# Patient Record
Sex: MALE | Race: WHITE | HISPANIC OR LATINO | Employment: FULL TIME | URBAN - METROPOLITAN AREA
[De-identification: names, ages, dates, MRNs, and addresses within clinical notes are randomized per-mention and may not be internally consistent; named-entity substitution may affect disease eponyms.]

---

## 2018-06-04 ENCOUNTER — HOSPITAL ENCOUNTER (EMERGENCY)
Facility: CLINIC | Age: 23
Discharge: HOME | End: 2018-06-04
Attending: FAMILY MEDICINE | Admitting: FAMILY MEDICINE
Payer: MEDICAID

## 2018-06-04 VITALS
OXYGEN SATURATION: 97 % | SYSTOLIC BLOOD PRESSURE: 121 MMHG | DIASTOLIC BLOOD PRESSURE: 89 MMHG | RESPIRATION RATE: 13 BRPM | HEART RATE: 82 BPM | TEMPERATURE: 98.2 F

## 2018-06-04 DIAGNOSIS — F41.9 ANXIETY: ICD-10-CM

## 2018-06-04 DIAGNOSIS — R20.2 PARESTHESIAS: ICD-10-CM

## 2018-06-04 DIAGNOSIS — R07.9 CHEST PAIN, UNSPECIFIED TYPE: ICD-10-CM

## 2018-06-04 LAB
ALBUMIN SERPL-MCNC: 4.1 G/DL (ref 3.4–5)
ALP SERPL-CCNC: 73 U/L (ref 40–150)
ALT SERPL W P-5'-P-CCNC: 75 U/L (ref 0–70)
ANION GAP SERPL CALCULATED.3IONS-SCNC: 8 MMOL/L (ref 3–14)
AST SERPL W P-5'-P-CCNC: 31 U/L (ref 0–45)
BASOPHILS # BLD AUTO: 0.1 10E9/L (ref 0–0.2)
BASOPHILS NFR BLD AUTO: 0.8 %
BILIRUB SERPL-MCNC: 0.5 MG/DL (ref 0.2–1.3)
BUN SERPL-MCNC: 12 MG/DL (ref 7–30)
CALCIUM SERPL-MCNC: 9.3 MG/DL (ref 8.5–10.1)
CHLORIDE SERPL-SCNC: 105 MMOL/L (ref 94–109)
CO2 SERPL-SCNC: 26 MMOL/L (ref 20–32)
CREAT SERPL-MCNC: 0.76 MG/DL (ref 0.66–1.25)
DIFFERENTIAL METHOD BLD: NORMAL
EOSINOPHIL # BLD AUTO: 0.1 10E9/L (ref 0–0.7)
EOSINOPHIL NFR BLD AUTO: 0.9 %
ERYTHROCYTE [DISTWIDTH] IN BLOOD BY AUTOMATED COUNT: 11.5 % (ref 10–15)
GFR SERPL CREATININE-BSD FRML MDRD: >90 ML/MIN/1.7M2
GLUCOSE SERPL-MCNC: 89 MG/DL (ref 70–99)
HCT VFR BLD AUTO: 43.6 % (ref 40–53)
HGB BLD-MCNC: 14.8 G/DL (ref 13.3–17.7)
IMM GRANULOCYTES # BLD: 0.1 10E9/L (ref 0–0.4)
IMM GRANULOCYTES NFR BLD: 0.8 %
LYMPHOCYTES # BLD AUTO: 2.6 10E9/L (ref 0.8–5.3)
LYMPHOCYTES NFR BLD AUTO: 27.9 %
MCH RBC QN AUTO: 29.7 PG (ref 26.5–33)
MCHC RBC AUTO-ENTMCNC: 33.9 G/DL (ref 31.5–36.5)
MCV RBC AUTO: 88 FL (ref 78–100)
MONOCYTES # BLD AUTO: 0.6 10E9/L (ref 0–1.3)
MONOCYTES NFR BLD AUTO: 6.5 %
NEUTROPHILS # BLD AUTO: 5.8 10E9/L (ref 1.6–8.3)
NEUTROPHILS NFR BLD AUTO: 63.1 %
NRBC # BLD AUTO: 0 10*3/UL
NRBC BLD AUTO-RTO: 0 /100
PLATELET # BLD AUTO: 260 10E9/L (ref 150–450)
POTASSIUM SERPL-SCNC: 4.3 MMOL/L (ref 3.4–5.3)
PROT SERPL-MCNC: 8.1 G/DL (ref 6.8–8.8)
RBC # BLD AUTO: 4.98 10E12/L (ref 4.4–5.9)
SODIUM SERPL-SCNC: 139 MMOL/L (ref 133–144)
TROPONIN I SERPL-MCNC: <0.015 UG/L (ref 0–0.04)
WBC # BLD AUTO: 9.2 10E9/L (ref 4–11)

## 2018-06-04 PROCEDURE — 84484 ASSAY OF TROPONIN QUANT: CPT | Performed by: FAMILY MEDICINE

## 2018-06-04 PROCEDURE — 85025 COMPLETE CBC W/AUTO DIFF WBC: CPT | Performed by: FAMILY MEDICINE

## 2018-06-04 PROCEDURE — 96374 THER/PROPH/DIAG INJ IV PUSH: CPT | Performed by: FAMILY MEDICINE

## 2018-06-04 PROCEDURE — 93005 ELECTROCARDIOGRAM TRACING: CPT | Performed by: FAMILY MEDICINE

## 2018-06-04 PROCEDURE — 93010 ELECTROCARDIOGRAM REPORT: CPT | Mod: Z6 | Performed by: FAMILY MEDICINE

## 2018-06-04 PROCEDURE — 25000128 H RX IP 250 OP 636: Performed by: FAMILY MEDICINE

## 2018-06-04 PROCEDURE — 99284 EMERGENCY DEPT VISIT MOD MDM: CPT | Mod: 25 | Performed by: FAMILY MEDICINE

## 2018-06-04 PROCEDURE — 96361 HYDRATE IV INFUSION ADD-ON: CPT | Performed by: FAMILY MEDICINE

## 2018-06-04 PROCEDURE — 80053 COMPREHEN METABOLIC PANEL: CPT | Performed by: FAMILY MEDICINE

## 2018-06-04 RX ORDER — KETOROLAC TROMETHAMINE 30 MG/ML
30 INJECTION, SOLUTION INTRAMUSCULAR; INTRAVENOUS ONCE
Status: COMPLETED | OUTPATIENT
Start: 2018-06-04 | End: 2018-06-04

## 2018-06-04 RX ORDER — SODIUM CHLORIDE, SODIUM LACTATE, POTASSIUM CHLORIDE, CALCIUM CHLORIDE 600; 310; 30; 20 MG/100ML; MG/100ML; MG/100ML; MG/100ML
1000 INJECTION, SOLUTION INTRAVENOUS CONTINUOUS
Status: DISCONTINUED | OUTPATIENT
Start: 2018-06-04 | End: 2018-06-04 | Stop reason: HOSPADM

## 2018-06-04 RX ADMIN — KETOROLAC TROMETHAMINE 30 MG: 30 INJECTION, SOLUTION INTRAMUSCULAR at 11:27

## 2018-06-04 RX ADMIN — SODIUM CHLORIDE, POTASSIUM CHLORIDE, SODIUM LACTATE AND CALCIUM CHLORIDE 1000 ML: 600; 310; 30; 20 INJECTION, SOLUTION INTRAVENOUS at 11:27

## 2018-06-04 NOTE — ED PROVIDER NOTES
History     Chief Complaint   Patient presents with     Chest Pain     left arm numbness. chest pain is reproducable with palpation. 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      ANTONIO August is a 22 year old male, past medical history is significant for hearing loss, presents to the emergency department with concerns of left-sided chest pain and left arm numbness.  History is obtained from the patient and to a lesser extent from his significant other.  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  Over the last couple of days left anterior chest pain which is sharp and worse with movement of the shoulder.  He notices pain going down his left arm and into his left neck and face.  Over the last 24 hours he has had 2 episodes where he has had left facial loss of sensation/numbness as well as loss of sensation numbness in his left arm.  Episode yesterday evening was first that lasted about 5 minutes and he had one again this morning at around 1030 that prompted him to come into the emergency department.  He was at work and was sent in from work.  His significant other notes no change in speech or difficulty expressing himself or understanding her.  No difficulties walking or moving arms or legs.  The patient is left-hand dominant.    Problem List:    Patient Active Problem List    Diagnosis Date Noted     HL (hearing loss) 06/29/2011     Priority: Medium        Past Medical History:    No past medical history on file.    Past Surgical History:    No past surgical history on file.    Family History:    No family history on file.    Social History:  Marital Status:  Single [1]  Social History   Substance Use Topics     Smoking status: Never Smoker     Smokeless tobacco: Never Used     Alcohol use No        Medications:      No current  outpatient prescriptions on file.      Review of Systems   All other systems reviewed and are negative.      Physical Exam   BP: 130/81  Pulse: 82  Heart Rate: 68  Temp: 98.2  F (36.8  C)  Resp: 18  SpO2: 98 %      Physical Exam   Constitutional: He is oriented to person, place, and time. He appears well-developed and well-nourished.   Anxious, does not appear ill or toxic.   HENT:   Head: Normocephalic and atraumatic.   Right Ear: External ear normal.   Left Ear: External ear normal.   Nose: Nose normal.   Mouth/Throat: Oropharynx is clear and moist.   Eyes: Conjunctivae and EOM are normal. Pupils are equal, round, and reactive to light.   Neck: Normal range of motion. Neck supple.   Cardiovascular: Normal rate, regular rhythm, normal heart sounds and intact distal pulses.    Pulmonary/Chest: Effort normal. He exhibits tenderness.       Abdominal: Soft. Bowel sounds are normal.   Musculoskeletal: Normal range of motion.   Neurological: He is alert and oriented to person, place, and time.   Skin: Skin is warm and dry.   Psychiatric: He has a normal mood and affect. His behavior is normal.   Nursing note and vitals reviewed.      ED Course     ED Course     Procedures               EKG Interpretation:      Interpreted by Josue Ivey  Time reviewed: 10:30  Symptoms at time of EKG: chest pain, L arm paresthesias   Rhythm: normal sinus   Rate: normal  Axis: normal  Ectopy: none  Conduction: normal  ST Segments/ T Waves: No ST-T wave changes  Q Waves: none  Comparison to prior: No old EKG available      Clinical Impression: normal EKG          Critical Care time:  none               Results for orders placed or performed during the hospital encounter of 06/04/18 (from the past 24 hour(s))   CBC with platelets differential   Result Value Ref Range    WBC 9.2 4.0 - 11.0 10e9/L    RBC Count 4.98 4.4 - 5.9 10e12/L    Hemoglobin 14.8 13.3 - 17.7 g/dL    Hematocrit 43.6 40.0 - 53.0 %    MCV 88 78 - 100 fl    MCH 29.7  26.5 - 33.0 pg    MCHC 33.9 31.5 - 36.5 g/dL    RDW 11.5 10.0 - 15.0 %    Platelet Count 260 150 - 450 10e9/L    Diff Method Automated Method     % Neutrophils 63.1 %    % Lymphocytes 27.9 %    % Monocytes 6.5 %    % Eosinophils 0.9 %    % Basophils 0.8 %    % Immature Granulocytes 0.8 %    Nucleated RBCs 0 0 /100    Absolute Neutrophil 5.8 1.6 - 8.3 10e9/L    Absolute Lymphocytes 2.6 0.8 - 5.3 10e9/L    Absolute Monocytes 0.6 0.0 - 1.3 10e9/L    Absolute Eosinophils 0.1 0.0 - 0.7 10e9/L    Absolute Basophils 0.1 0.0 - 0.2 10e9/L    Abs Immature Granulocytes 0.1 0 - 0.4 10e9/L    Absolute Nucleated RBC 0.0    Comprehensive metabolic panel   Result Value Ref Range    Sodium 139 133 - 144 mmol/L    Potassium 4.3 3.4 - 5.3 mmol/L    Chloride 105 94 - 109 mmol/L    Carbon Dioxide 26 20 - 32 mmol/L    Anion Gap 8 3 - 14 mmol/L    Glucose 89 70 - 99 mg/dL    Urea Nitrogen 12 7 - 30 mg/dL    Creatinine 0.76 0.66 - 1.25 mg/dL    GFR Estimate >90 >60 mL/min/1.7m2    GFR Estimate If Black >90 >60 mL/min/1.7m2    Calcium 9.3 8.5 - 10.1 mg/dL    Bilirubin Total 0.5 0.2 - 1.3 mg/dL    Albumin 4.1 3.4 - 5.0 g/dL    Protein Total 8.1 6.8 - 8.8 g/dL    Alkaline Phosphatase 73 40 - 150 U/L    ALT 75 (H) 0 - 70 U/L    AST 31 0 - 45 U/L   Troponin I   Result Value Ref Range    Troponin I ES <0.015 0.000 - 0.045 ug/L       Medications   lactated ringers BOLUS 1,000 mL (1,000 mLs Intravenous New Bag 6/4/18 1127)     Followed by   lactated ringers infusion (not administered)   ketorolac (TORADOL) injection 30 mg (30 mg Intravenous Given 6/4/18 1127)     1:31 PM  Patient declined chest x-ray.  1:44 PM  Lab diagnostics and EKG are reviewed with the patient.  His symptoms have completely resolved.  Based upon his history, age, presentation features and exam I have low suspicion for ACS or alternate differential consideration to more serious underlying pathology.  I suspect most of this is musculoskeletal and anxiety.  I shared this concern  with the patient.  Plan for disposition to home.  Have completed workability for him for reduction in the intensity of work to be done.  If his symptoms are not improving or worsening/changing return here or follow-up in clinic with his primary care provider.    Assessments & Plan (with Medical Decision Making)   Assessment and plan with medical decision making at the time stamp above.      Disclaimer: This note consists of symbols derived from keyboarding, dictation and/or voice recognition software. As a result, there may be errors in the script that have gone undetected. Please consider this when interpreting information found in this chart.      I have reviewed the nursing notes.    I have reviewed the findings, diagnosis, plan and need for follow up with the patient.       New Prescriptions    No medications on file       Final diagnoses:   Chest pain, unspecified type   Paresthesias   Anxiety       6/4/2018   [facility] EMERGENCY DEPARTMENT     Josue Ivey MD  06/04/18 7217

## 2018-06-04 NOTE — ED AVS SNAPSHOT
[facility] Emergency Department    [address] [address]    Phone:  [phone]    Fax:  [phone]                                       Connor August   MRN: [id number]    Department:  [facility] Emergency Department   Date of Visit:  6/4/2018           Patient Information     Date Of Birth          1995        Your diagnoses for this visit were:     Chest pain, unspecified type     Paresthesias     Anxiety        You were seen by Josue Ivey MD.      Follow-up Information     Schedule an appointment as soon as possible for a visit with No Ref-Primary, Physician.    Why:  As needed, If symptoms worsen        Discharge Instructions       Decrease use of left upper extremity as specified in workability.  Tylenol/ibuprofen as needed for pain.  If symptoms do not resolve or recur/change please return to the emergency department or follow-up in clinic with your primary care provider.      24 Hour Appointment Hotline       To make an appointment at any [facility], call [phone] ([phone]). If you don't have a family doctor or clinic, we will help you find one. [facility] are conveniently located to serve the needs of you and your family.             Review of your medicines      Notice     You have not been prescribed any medications.            Procedures and tests performed during your visit     CBC with platelets differential    Comprehensive metabolic panel    EKG 12 lead    Troponin I      Orders Needing Specimen Collection     None      Pending Results     No orders found from 6/2/2018 to 6/5/2018.            Pending Culture Results     No orders found from 6/2/2018 to 6/5/2018.            Pending Results Instructions     If you had any lab results that were not finalized at the time of your Discharge, you can call the ED Lab Result RN at [phone]. You will be contacted by this team for any positive Lab results or changes in treatment.  The nurses are available 7 days a week from 10A to 6:30P.  You can leave a message 24 hours per day and they will return your call.        Test Results From Your Hospital Stay        6/4/2018 11:56 AM      Component Results     Component Value Ref Range & Units Status    WBC 9.2 4.0 - 11.0 10e9/L Final    RBC Count 4.98 4.4 - 5.9 10e12/L Final    Hemoglobin 14.8 13.3 - 17.7 g/dL Final    Hematocrit 43.6 40.0 - 53.0 % Final    MCV 88 78 - 100 fl Final    MCH 29.7 26.5 - 33.0 pg Final    MCHC 33.9 31.5 - 36.5 g/dL Final    RDW 11.5 10.0 - 15.0 % Final    Platelet Count 260 150 - 450 10e9/L Final    Diff Method Automated Method  Final    % Neutrophils 63.1 % Final    % Lymphocytes 27.9 % Final    % Monocytes 6.5 % Final    % Eosinophils 0.9 % Final    % Basophils 0.8 % Final    % Immature Granulocytes 0.8 % Final    Nucleated RBCs 0 0 /100 Final    Absolute Neutrophil 5.8 1.6 - 8.3 10e9/L Final    Absolute Lymphocytes 2.6 0.8 - 5.3 10e9/L Final    Absolute Monocytes 0.6 0.0 - 1.3 10e9/L Final    Absolute Eosinophils 0.1 0.0 - 0.7 10e9/L Final    Absolute Basophils 0.1 0.0 - 0.2 10e9/L Final    Abs Immature Granulocytes 0.1 0 - 0.4 10e9/L Final    Absolute Nucleated RBC 0.0  Final         6/4/2018 12:35 PM      Component Results     Component Value Ref Range & Units Status    Sodium 139 133 - 144 mmol/L Final    Potassium 4.3 3.4 - 5.3 mmol/L Final    Specimen slightly hemolyzed, potassium may be falsely elevated    Chloride 105 94 - 109 mmol/L Final    Carbon Dioxide 26 20 - 32 mmol/L Final    Anion Gap 8 3 - 14 mmol/L Final    Glucose 89 70 - 99 mg/dL Final    Urea Nitrogen 12 7 - 30 mg/dL Final    Creatinine 0.76 0.66 - 1.25 mg/dL Final    GFR Estimate >90 >60 mL/min/1.7m2 Final    Non  GFR Calc    GFR Estimate If Black >90 >60 mL/min/1.7m2 Final    African American GFR Calc    Calcium 9.3 8.5 - 10.1 mg/dL Final    Bilirubin Total 0.5 0.2 - 1.3 mg/dL Final    Albumin 4.1 3.4 - 5.0 g/dL Final     "Protein Total 8.1 6.8 - 8.8 g/dL Final    Alkaline Phosphatase 73 40 - 150 U/L Final    ALT 75 (H) 0 - 70 U/L Final    AST 31 0 - 45 U/L Final    Specimen is hemolyzed which can falsely elevate AST. Analysis of a   non-hemolyzed specimen may result in a lower value.           2018 12:35 PM      Component Results     Component Value Ref Range & Units Status    Troponin I ES <0.015 0.000 - 0.045 ug/L Final    The 99th percentile for upper reference range is 0.045 ug/L.  Troponin values   in the range of 0.045 - 0.120 ug/L may be associated with risks of adverse   clinical events.                  Thank you for choosing [facility]       Thank you for choosing [facility] for your care. Our goal is always to provide you with excellent care. Hearing back from our patients is one way we can continue to improve our services. Please take a few minutes to complete the written survey that you may receive in the mail after you visit with us. Thank you!        [employer] Information     [employer] lets you send messages to your doctor, view your test results, renew your prescriptions, schedule appointments and more. To sign up, go to [email or web address] . Click on \"Log in\" on the left side of the screen, which will take you to the Welcome page. Then click on \"Sign up Now\" on the right side of the page.     You will be asked to enter the access code listed below, as well as some personal information. Please follow the directions to create your username and password.     Your access code is: [email or web address]  Expires: 2018  1:46 PM     Your access code will  in 90 days. If you need help or a new code, please call your [facility] clinic or [phone].        Care EveryWhere ID     This is your Care EveryWhere ID. This could be used by other organizations to access your [facility] medical records  [id number]        Equal Access to Services     JOSEPH CASTELLANOS AH: Vanessa Toscano, ryne sterling, jm bloom " aurora sparrow ah. So [facility] [phone].    ATENCIÓN: Si habla español, tiene a landrum disposición servicios gratuitos de asistencia lingüística. Llame al [phone].    We comply with applicable federal civil rights laws and [place] laws. We do not discriminate on the basis of race, color, national origin, age, disability, sex, sexual orientation, or gender identity.            After Visit Summary       This is your record. Keep this with you and show to your community pharmacist(s) and doctor(s) at your next visit.

## 2018-06-04 NOTE — DISCHARGE INSTRUCTIONS
Decrease use of left upper extremity as specified in workability.  Tylenol/ibuprofen as needed for pain.  If symptoms do not resolve or recur/change please return to the emergency department or follow-up in clinic with your primary care provider.

## 2018-06-04 NOTE — ED AVS SNAPSHOT
[facility] Emergency Department    [address] [address]    Phone:  [phone]    Fax:  [phone]                                       Connor August   MRN: [id number]    Department:  [facility] Emergency Department   Date of Visit:  6/4/2018           After Visit Summary Signature Page     I have received my discharge instructions, and my questions have been answered. I have discussed any challenges I see with this plan with the nurse or doctor.    ..........................................................................................................................................  Patient/Patient Representative Signature      ..........................................................................................................................................  Patient Representative Print Name and Relationship to Patient    ..................................................               ................................................  Date                                            Time    ..........................................................................................................................................  Reviewed by Signature/Title    ...................................................              ..............................................  Date                                                            Time

## 2018-06-04 NOTE — ED TRIAGE NOTES
Pt arrives to triage com[plaining of chest pain that increases with deep breath and shoulder movement. Left shoulder and arm are numb. Recently started a new job pouring concrete.

## 2018-06-05 ENCOUNTER — TELEPHONE (OUTPATIENT)
Dept: FAMILY MEDICINE | Facility: CLINIC | Age: 23
End: 2018-06-05

## 2018-06-05 NOTE — TELEPHONE ENCOUNTER
Patient's significant other Megan calling to schedule appointment. I did not give out any information and only recorded what she is reporting. She reports he was seen in ED yesterday and was told to follow up in clinic. She reports he was seen for chest muscle pain on left side and left shoulder pain. Told he had anxiety.  He is not taking OTC medication. She reports  Trying heat-some relief.     Denies: SHORTNESS OF BREATH, dizziness, Nausea, vomiting    Patient was scheduled for 1440 at [facility] per request.      Lashawn RICCI RN

## 2018-06-05 NOTE — TELEPHONE ENCOUNTER
Patient's significant other Megan called to make appointment for chest pain. Patient was seen in ED yesterday for similar complaint. Asked by [facility] to triage. I do not see consent to discuss information with Megan on file.     Left message for patient to return call to clinic.     Lashawn Macias RN

## 2018-06-06 ENCOUNTER — OFFICE VISIT (OUTPATIENT)
Dept: FAMILY MEDICINE | Facility: CLINIC | Age: 23
End: 2018-06-06
Payer: MEDICAID

## 2018-06-06 VITALS
WEIGHT: 223 LBS | HEIGHT: 67 IN | BODY MASS INDEX: 35 KG/M2 | SYSTOLIC BLOOD PRESSURE: 112 MMHG | DIASTOLIC BLOOD PRESSURE: 82 MMHG | TEMPERATURE: 97.7 F | HEART RATE: 68 BPM

## 2018-06-06 DIAGNOSIS — R11.2 NON-INTRACTABLE VOMITING WITH NAUSEA, UNSPECIFIED VOMITING TYPE: ICD-10-CM

## 2018-06-06 DIAGNOSIS — R06.83 SNORING: ICD-10-CM

## 2018-06-06 DIAGNOSIS — R10.13 EPIGASTRIC PAIN: ICD-10-CM

## 2018-06-06 DIAGNOSIS — R19.5 DARK STOOLS: ICD-10-CM

## 2018-06-06 DIAGNOSIS — K21.9 GASTROESOPHAGEAL REFLUX DISEASE, ESOPHAGITIS PRESENCE NOT SPECIFIED: Primary | ICD-10-CM

## 2018-06-06 DIAGNOSIS — Z23 NEED FOR VACCINATION: ICD-10-CM

## 2018-06-06 DIAGNOSIS — R13.10 DYSPHAGIA, UNSPECIFIED TYPE: ICD-10-CM

## 2018-06-06 DIAGNOSIS — R53.83 FATIGUE, UNSPECIFIED TYPE: ICD-10-CM

## 2018-06-06 LAB — LIPASE SERPL-CCNC: 114 U/L (ref 73–393)

## 2018-06-06 PROCEDURE — 36415 COLL VENOUS BLD VENIPUNCTURE: CPT | Performed by: FAMILY MEDICINE

## 2018-06-06 PROCEDURE — 83690 ASSAY OF LIPASE: CPT | Performed by: FAMILY MEDICINE

## 2018-06-06 PROCEDURE — 90471 IMMUNIZATION ADMIN: CPT | Performed by: FAMILY MEDICINE

## 2018-06-06 PROCEDURE — 99204 OFFICE O/P NEW MOD 45 MIN: CPT | Mod: 25 | Performed by: FAMILY MEDICINE

## 2018-06-06 PROCEDURE — 90715 TDAP VACCINE 7 YRS/> IM: CPT | Performed by: FAMILY MEDICINE

## 2018-06-06 NOTE — MR AVS SNAPSHOT
After Visit Summary   6/6/2018    Connor August    MRN: [id number]           Patient Information     Date Of Birth          1995        Visit Information        Provider Department      6/6/2018 10:20 AM Jonathan Franco MD [facility]        Today's Diagnoses     Gastroesophageal reflux disease, esophagitis presence not specified    -  1    Dysphagia, unspecified type        Epigastric pain        Snoring        Non-intractable vomiting with nausea, unspecified vomiting type        Dark stools        Need for vaccination        Fatigue, unspecified type          Care Instructions    For your abdominal pain I am concerned that you have gastritis and reflux going on.  This may also be causing some changes to the esophagus which is the 'food tube' and causing your food to pass slower.    Please get a gastroscopy which is a scope to take a look on the inside.    Please take a stomach acid blocker called omeprazole 20 mg a day.    Follow up with me in 3 weeks.    Also please get a sleep study due to your snoring and some fatigue and seeing that your tonsils are large. I am concerned you may have some sleep apnea going on too.          Follow-ups after your visit        Additional Services     GASTROENTEROLOGY ADULT REF PROCEDURE ONLY       Last Lab Result: Creatinine (mg/dL)       Date                     Value                 06/04/2018               0.76             ----------  Body mass index is 35.45 kg/(m^2).     Needed:  No  Language:  English    Patient will be contacted to schedule procedure.     Please be aware that coverage of these services is subject to the terms and limitations of your health insurance plan.  Call member services at your health plan with any benefit or coverage questions.  Any procedures must be performed at a [facility] facility OR coordinated by your clinic's referral office.    Please bring the following with you to your appointment:    (1)  Any X-Rays, CTs or MRIs which have been performed.  Contact the facility where they were done to arrange for  prior to your scheduled appointment.    (2) List of current medications   (3) This referral request   (4) Any documents/labs given to you for this referral    Patient has gerd, nausea, vomiting and some dysphagia, feeling food going down slowly.            SLEEP EVALUATION & MANAGEMENT REFERRAL - ADULT [facility]  [phone] (Age 2 and up)       Please be aware that coverage of these services is subject to the terms and limitations of your health insurance plan.  Call member services at your health plan with any benefit or coverage questions.      Please bring the following to your appointment:    >>   List of current medications   >>   This referral request   >>   Any documents/labs given to you for this referral    Patient has snoring, daytime fatigue, noted tonsils are 3+                  Follow-up notes from your care team     Return in about 3 weeks (around 6/27/2018).      Your next 10 appointments already scheduled     Jun 27, 2018  4:20 PM CDT   SHORT with Jonathan Franco MD   [facility] ([facility])    [address] [facility] [address] [phone]              Future tests that were ordered for you today     Open Future Orders        Priority Expected Expires Ordered    SLEEP EVALUATION & MANAGEMENT REFERRAL - [facility]  [phone] (Age 2 and up) Routine  6/6/2019 6/6/2018            Who to contact     If you have questions or need follow up information about today's clinic visit or your schedule please contact [facility] directly at [phone].  Normal or non-critical lab and imaging results will be communicated to you by MyChart, letter or phone within 4 business days after the clinic has received the results. If you do not hear from us within 7 days, please contact the  "clinic through [employer] or phone. If you have a critical or abnormal lab result, we will notify you by phone as soon as possible.  Submit refill requests through Scrybe or call your pharmacy and they will forward the refill request to us. Please allow 3 business days for your refill to be completed.          Additional Information About Your Visit        Authentix[employer]Cell[employer] Information     Scrybe lets you send messages to your doctor, view your test results, renew your prescriptions, schedule appointments and more. To sign up, go to [email or web address] . Click on \"Log in\" on the left side of the screen, which will take you to the Welcome page. Then click on \"Sign up Now\" on the right side of the page.     You will be asked to enter the access code listed below, as well as some personal information. Please follow the directions to create your username and password.     Your access code is: [email or web address]  Expires: 2018  1:46 PM     Your access code will  in 90 days. If you need help or a new code, please call your [facility] clinic or [phone].        Care EveryWhere ID     This is your Care EveryWhere ID. This could be used by other organizations to access your [facility] medical records  [id number]        Your Vitals Were     Pulse Temperature Height BMI (Body Mass Index)          68 97.7  F (36.5  C) (Tympanic) 5' 6.5\" (1.689 m) 35.45 kg/m2         Blood Pressure from Last 3 Encounters:   18 112/82   18 121/89   16 141/88    Weight from Last 3 Encounters:   18 223 lb (101.2 kg)   14 195 lb (88.5 kg) (94 %)*   12 192 lb (87.1 kg) (96 %)*     * Growth percentiles are based on CDC 2-20 Years data.              We Performed the Following     1st  Administration  [55497]     GASTROENTEROLOGY ADULT REF PROCEDURE ONLY     Lipase     TDAP VACCINE (ADACEL) [37912.002]          Today's Medication Changes          These changes are accurate as of 18 11:15 AM.  If you have any " questions, ask your nurse or doctor.               Start taking these medicines.        Dose/Directions    omeprazole 20 MG CR capsule   Commonly known as:  priLOSEC   Used for:  Gastroesophageal reflux disease, esophagitis presence not specified, Epigastric pain   Started by:  Jonathan Franco MD        Dose:  20 mg   Take 1 capsule (20 mg) by mouth daily   Quantity:  30 capsule   Refills:  3            Where to get your medicines      These medications were sent to [facility] [address] [place] [address] [address]   [address] [facility] [address]     Phone:  [phone]     omeprazole 20 MG CR capsule                Primary Care Provider Fax #    Physician No Ref-Primary [phone]       No address on file        Equal Access to Services     JOSEPH CASTELLANOS : Vanessa Toscano, waambrosio sterling, qaybta kaalmada kyara, auroar arevalo . So [facility] [phone].    ATENCIÓN: Si habla español, tiene a landrum disposición servicios gratuitos de asistencia lingüística. Llame al [phone].    We comply with applicable federal civil rights laws and [place] laws. We do not discriminate on the basis of race, color, national origin, age, disability, sex, sexual orientation, or gender identity.            Thank you!     Thank you for choosing [facility]  for your care. Our goal is always to provide you with excellent care. Hearing back from our patients is one way we can continue to improve our services. Please take a few minutes to complete the written survey that you may receive in the mail after your visit with us. Thank you!             Your Updated Medication List - Protect others around you: Learn how to safely use, store and throw away your medicines at www.disposemymeds.org.          This list is accurate as of 6/6/18 11:15 AM.  Always use your most recent med list.                   Brand Name Dispense Instructions for use Diagnosis    omeprazole  20 MG CR capsule    priLOSEC    30 capsule    Take 1 capsule (20 mg) by mouth daily    Gastroesophageal reflux disease, esophagitis presence not specified, Epigastric pain

## 2018-06-06 NOTE — PATIENT INSTRUCTIONS
For your abdominal pain I am concerned that you have gastritis and reflux going on.  This may also be causing some changes to the esophagus which is the 'food tube' and causing your food to pass slower.    Please get a gastroscopy which is a scope to take a look on the inside.    Please take a stomach acid blocker called omeprazole 20 mg a day.    Follow up with me in 3 weeks.    Also please get a sleep study due to your snoring and some fatigue and seeing that your tonsils are large. I am concerned you may have some sleep apnea going on too.

## 2018-06-06 NOTE — NURSING NOTE
Screening Questionnaire for Adult Immunization    Are you sick today?   No   Do you have allergies to medications, food, a vaccine component or latex?   No   Have you ever had a serious reaction after receiving a vaccination?   No   Do you have a long-term health problem with heart disease, lung disease, asthma, kidney disease, metabolic disease (e.g. diabetes), anemia, or other blood disorder?   No   Do you have cancer, leukemia, HIV/AIDS, or any other immune system problem?   No   In the past 3 months, have you taken medications that affect  your immune system, such as prednisone, other steroids, or anticancer drugs; drugs for the treatment of rheumatoid arthritis, Crohn s disease, or psoriasis; or have you had radiation treatments?   No   Have you had a seizure, or a brain or other nervous system problem?   No   During the past year, have you received a transfusion of blood or blood     products, or been given immune (gamma) globulin or antiviral drug?   No   For women: Are you pregnant or is there a chance you could become        pregnant during the next month?   No   Have you received any vaccinations in the past 4 weeks?   No     Immunization questionnaire answers were all negative.        Per orders of Dr. Franco, injection of Tdap given by Amanda Licona.   Patient instructed to remain in clinic for 15 minutes afterwards, and to report any adverse reaction to me immediately.       Screening performed by Amanda Licona on 6/6/2018 at 10:41 AM.

## 2018-06-06 NOTE — PROGRESS NOTES
SUBJECTIVE:   Connor August is a 22 year old male who presents to clinic today for the following health issues:    Chief Complaint   Patient presents with     Gastric Problem     Has a history of using a med for GERD when he was a teenager. Has not used it in years. Now the past 3 years he has had problems in the AM and can't keep down his breakfast.          GERD/Heartburn      Duration: 3 years    Description (location/character/radiation): stomach and mid-chest    Intensity:  moderate    Accompanying signs and symptoms:  food getting stuck: no   nausea/vomiting/blood: no   abdominal pain: YES-left side mostly  black/tarry or bloody stools: YES- very dark stools:    History (similar episodes/previous evaluation): yes as mention in CC    Precipitating or alleviating factors:  worse with no particular food or drink.  current NSAID/Aspirin use: no     Therapies tried and outcome: none    Patient does have some issues with food going down slower.  He feels it go down.  He wakes up at night some times vomiting.  Not on any stomach medications.    He also snores at night.  Has some daytime fatigue.        Problem list and histories reviewed & adjusted, as indicated.  Additional history: as documented        Reviewed and updated as needed this visit by clinical staff  Tobacco  Allergies  Meds  Med Hx  Surg Hx  Fam Hx  Soc Hx      Reviewed and updated as needed this visit by Provider         ROS:  CONSTITUTIONAL:NEGATIVE for fever, chills, change in weight  INTEGUMENTARY/SKIN: NEGATIVE for worrisome rashes, moles or lesions  HEENT negative  RESP:NEGATIVE for significant cough or SOB  CV: work up for chest pain was negative recently in ED  GI: as above  MUSCULOSKELETAL: intermittent chest pain with symptoms  Neuro: negative  PSYCHIATRIC: NEGATIVE for changes in mood or affect    OBJECTIVE:                                                    /82 (Cuff Size: Adult Large)  Pulse 68  Temp 97.7  F (36.5  C)  "(Tympanic)  Ht 5' 6.5\" (1.689 m)  Wt 223 lb (101.2 kg)  BMI 35.45 kg/m2  Body mass index is 35.45 kg/(m^2).  GENERAL APPEARANCE: alert, no distress and cooperative  HENT: ear canals and TM's normal, nose and mouth without ulcers or lesions and noted tonsils 3+  NECK: no adenopathy, no asymmetry, masses, or scars and thyroid normal to palpation  RESP: lungs clear to auscultation - no rales, rhonchi or wheezes  CV: regular rates and rhythm, normal S1 S2, no S3 or S4 and no murmur, click or rub  ABDOMEN: noted pain in the mid epigastric region.  No rebound or guarding  MS: extremities normal- no gross deformities noted  SKIN: no suspicious lesions or rashes  NEURO: Normal strength and tone, mentation intact and speech normal  PSYCH: mentation appears normal and affect normal/bright         ASSESSMENT/PLAN:                                                    1. Gastroesophageal reflux disease, esophagitis presence not specified  Start PPI, due to dysphagia, get gastroscopy.  Follow up in 3 weeks  - GASTROENTEROLOGY ADULT REF PROCEDURE ONLY  - omeprazole (PRILOSEC) 20 MG CR capsule; Take 1 capsule (20 mg) by mouth daily  Dispense: 30 capsule; Refill: 3    2. Dysphagia, unspecified type  As above  - GASTROENTEROLOGY ADULT REF PROCEDURE ONLY    3. Epigastric pain  Check lipase today too  - GASTROENTEROLOGY ADULT REF PROCEDURE ONLY  - Lipase  - omeprazole (PRILOSEC) 20 MG CR capsule; Take 1 capsule (20 mg) by mouth daily  Dispense: 30 capsule; Refill: 3    4. Snoring  Get a sleep consult, could have CHRISTIE due to enlarged tonsils.  - SLEEP EVALUATION & MANAGEMENT REFERRAL - ADULT -[facility]  [phone] (Age 2 and up); Future    5. Non-intractable vomiting with nausea, unspecified vomiting type      6. Dark stools  Had hgb recently check and it was normal    7. Need for vaccination    - TDAP VACCINE (ADACEL) [80104.002]  - 1st  Administration  [51135]    8. Fatigue, unspecified type  Could be due to " CHRISTIE      See Patient Instructions    Jonathan Franco MD  [facility]

## 2018-06-06 NOTE — LETTER
June 6, 2018      Connor August  [address]    [place] [address]        Dear ,    We are writing to inform you of your test results.      No signs of pancreatitis.  Resulted Orders   Lipase   Result Value Ref Range    Lipase 114 73 - 393 U/L       If you have any questions or concerns, please call the clinic at the number listed above.       Sincerely,        Jonathan Franco MD/cb

## 2018-06-17 ENCOUNTER — ANESTHESIA EVENT (OUTPATIENT)
Dept: GASTROENTEROLOGY | Facility: CLINIC | Age: 23
End: 2018-06-17
Payer: MEDICAID

## 2018-06-17 NOTE — ANESTHESIA PREPROCEDURE EVALUATION
Anesthesia Evaluation     . Pt has had prior anesthetic.     No history of anesthetic complications          ROS/MED HX    ENT/Pulmonary:  - neg pulmonary ROS     Neurologic: Comment: Hearing loss - neg neurologic ROS     Cardiovascular:  - neg cardiovascular ROS   (+) ----. : . . . :. . Previous cardiac testing date:results:date: results:ECG reviewed date:6-4-18 results:Sinus Rhythm   WITHIN NORMAL LIMITS   date: results:          METS/Exercise Tolerance:  >4 METS   Hematologic:  - neg hematologic  ROS       Musculoskeletal:  - neg musculoskeletal ROS       GI/Hepatic: Comment: gastroesophageal reflux, epigastric pain, dysphagia - neg GI/hepatic ROS       Renal/Genitourinary:  - ROS Renal section negative       Endo:     (+) Obesity, .      Psychiatric:  - neg psychiatric ROS       Infectious Disease:  - neg infectious disease ROS       Malignancy:      - no malignancy   Other:    - neg other ROS                 Physical Exam  Normal systems: cardiovascular, pulmonary and dental    Airway   Mallampati: I  TM distance: >3 FB  Neck ROM: full    Dental     Cardiovascular   Rhythm and rate: regular and normal      Pulmonary    breath sounds clear to auscultation                    Anesthesia Plan      History & Physical Review  History and physical reviewed and following examination; no interval change.    ASA Status:  1 .    NPO Status:  > 6 hours    Plan for MAC Reason for MAC:  Deep or markedly invasive procedure (G8)         Postoperative Care      Consents  Anesthetic plan, risks, benefits and alternatives discussed with:  Patient and Spouse..                          .

## 2018-06-21 ENCOUNTER — ANESTHESIA (OUTPATIENT)
Dept: GASTROENTEROLOGY | Facility: CLINIC | Age: 23
End: 2018-06-21
Payer: MEDICAID

## 2018-06-21 ENCOUNTER — SURGERY (OUTPATIENT)
Age: 23
End: 2018-06-21

## 2018-06-21 ENCOUNTER — HOSPITAL ENCOUNTER (OUTPATIENT)
Facility: CLINIC | Age: 23
Discharge: HOME | End: 2018-06-21
Attending: SURGERY | Admitting: SURGERY
Payer: MEDICAID

## 2018-06-21 VITALS
SYSTOLIC BLOOD PRESSURE: 124 MMHG | DIASTOLIC BLOOD PRESSURE: 79 MMHG | WEIGHT: 223 LBS | HEIGHT: 67 IN | BODY MASS INDEX: 35 KG/M2 | OXYGEN SATURATION: 100 % | RESPIRATION RATE: 16 BRPM | TEMPERATURE: 98.5 F

## 2018-06-21 LAB — UPPER GI ENDOSCOPY: NORMAL

## 2018-06-21 PROCEDURE — 37000008 ZZH ANESTHESIA TECHNICAL FEE, 1ST 30 MIN: Performed by: SURGERY

## 2018-06-21 PROCEDURE — 43235 EGD DIAGNOSTIC BRUSH WASH: CPT | Performed by: SURGERY

## 2018-06-21 PROCEDURE — 25000132 ZZH RX MED GY IP 250 OP 250 PS 637: Performed by: SURGERY

## 2018-06-21 PROCEDURE — 25000125 ZZHC RX 250: Performed by: SURGERY

## 2018-06-21 PROCEDURE — 25000128 H RX IP 250 OP 636: Performed by: SURGERY

## 2018-06-21 PROCEDURE — 25000125 ZZHC RX 250: Performed by: NURSE ANESTHETIST, CERTIFIED REGISTERED

## 2018-06-21 PROCEDURE — 25000128 H RX IP 250 OP 636: Performed by: NURSE ANESTHETIST, CERTIFIED REGISTERED

## 2018-06-21 RX ORDER — LIDOCAINE 40 MG/G
CREAM TOPICAL
Status: DISCONTINUED | OUTPATIENT
Start: 2018-06-21 | End: 2018-06-21 | Stop reason: HOSPADM

## 2018-06-21 RX ORDER — SODIUM CHLORIDE, SODIUM LACTATE, POTASSIUM CHLORIDE, CALCIUM CHLORIDE 600; 310; 30; 20 MG/100ML; MG/100ML; MG/100ML; MG/100ML
INJECTION, SOLUTION INTRAVENOUS CONTINUOUS
Status: DISCONTINUED | OUTPATIENT
Start: 2018-06-21 | End: 2018-06-21 | Stop reason: HOSPADM

## 2018-06-21 RX ORDER — SIMETHICONE 40MG/0.6ML
SUSPENSION, DROPS(FINAL DOSAGE FORM)(ML) ORAL PRN
Status: DISCONTINUED | OUTPATIENT
Start: 2018-06-21 | End: 2018-06-21 | Stop reason: HOSPADM

## 2018-06-21 RX ORDER — PROPOFOL 10 MG/ML
INJECTION, EMULSION INTRAVENOUS PRN
Status: DISCONTINUED | OUTPATIENT
Start: 2018-06-21 | End: 2018-06-21

## 2018-06-21 RX ORDER — ONDANSETRON 2 MG/ML
4 INJECTION INTRAMUSCULAR; INTRAVENOUS
Status: DISCONTINUED | OUTPATIENT
Start: 2018-06-21 | End: 2018-06-21 | Stop reason: HOSPADM

## 2018-06-21 RX ADMIN — TOPICAL ANESTHETIC 1 SPRAY: 200 SPRAY DENTAL; PERIODONTAL at 13:38

## 2018-06-21 RX ADMIN — LIDOCAINE HYDROCHLORIDE 1 ML: 10 INJECTION, SOLUTION EPIDURAL; INFILTRATION; INTRACAUDAL; PERINEURAL at 12:59

## 2018-06-21 RX ADMIN — PROPOFOL 150 MG: 10 INJECTION, EMULSION INTRAVENOUS at 13:37

## 2018-06-21 RX ADMIN — PROPOFOL 150 MG: 10 INJECTION, EMULSION INTRAVENOUS at 13:38

## 2018-06-21 RX ADMIN — SIMETHICONE 20 MG: 20 SUSPENSION/ DROPS ORAL at 13:38

## 2018-06-21 RX ADMIN — SODIUM CHLORIDE, POTASSIUM CHLORIDE, SODIUM LACTATE AND CALCIUM CHLORIDE: 600; 310; 30; 20 INJECTION, SOLUTION INTRAVENOUS at 12:58

## 2018-06-21 NOTE — H&P
22 year old year old male here for upper endoscopy for GERD        Patient Active Problem List   Diagnosis     HL (hearing loss)       History reviewed. No pertinent past medical history.    History reviewed. No pertinent surgical history.    Family History   Problem Relation Age of Onset     Coronary Artery Disease Mother      Alcoholism Maternal Grandfather      Unknown/Adopted Paternal Grandmother      Unknown/Adopted Paternal Grandfather        No current outpatient prescriptions on file.       Allergies   Allergen Reactions     Nka [No Known Allergies]        Pt reports that he has never smoked. He has never used smokeless tobacco. He reports that he drinks alcohol. He reports that he does not use illicit drugs.    Exam:    Awake, Alert OX3  Lungs - CTA bilaterally  CV - RRR, no murmurs, distal pulses intact  Abd - soft, non-distended, non-tender, +BS  Extr - No cyanosis or edema    A/P 22 year old year old male in need of upper endoscopy for GERD. Risks, benefits, alternatives, and complications were discussed including the possibility of perforation and the patient agreed to proceed.    Chalino Pinto MD

## 2018-10-15 ENCOUNTER — APPOINTMENT (OUTPATIENT)
Dept: GENERAL RADIOLOGY | Facility: CLINIC | Age: 23
End: 2018-10-15
Attending: PHYSICIAN ASSISTANT
Payer: COMMERCIAL

## 2018-10-15 ENCOUNTER — HOSPITAL ENCOUNTER (EMERGENCY)
Facility: CLINIC | Age: 23
Discharge: HOME | End: 2018-10-15
Attending: PHYSICIAN ASSISTANT | Admitting: PHYSICIAN ASSISTANT
Payer: COMMERCIAL

## 2018-10-15 VITALS
HEART RATE: 85 BPM | TEMPERATURE: 97.4 F | OXYGEN SATURATION: 99 % | SYSTOLIC BLOOD PRESSURE: 132 MMHG | RESPIRATION RATE: 20 BRPM | DIASTOLIC BLOOD PRESSURE: 79 MMHG

## 2018-10-15 DIAGNOSIS — S90.01XA CONTUSION OF RIGHT ANKLE, INITIAL ENCOUNTER: ICD-10-CM

## 2018-10-15 DIAGNOSIS — S90.31XA CONTUSION OF RIGHT FOOT, INITIAL ENCOUNTER: ICD-10-CM

## 2018-10-15 DIAGNOSIS — S93.401A SPRAIN OF RIGHT ANKLE, UNSPECIFIED LIGAMENT, INITIAL ENCOUNTER: ICD-10-CM

## 2018-10-15 PROCEDURE — 73590 X-RAY EXAM OF LOWER LEG: CPT | Mod: RT

## 2018-10-15 PROCEDURE — 99213 OFFICE O/P EST LOW 20 MIN: CPT | Mod: Z6 | Performed by: PHYSICIAN ASSISTANT

## 2018-10-15 PROCEDURE — 73630 X-RAY EXAM OF FOOT: CPT | Mod: RT

## 2018-10-15 PROCEDURE — G0463 HOSPITAL OUTPT CLINIC VISIT: HCPCS | Performed by: PHYSICIAN ASSISTANT

## 2018-10-15 ASSESSMENT — ENCOUNTER SYMPTOMS
WOUND: 1
NUMBNESS: 0
WEAKNESS: 0

## 2018-10-15 NOTE — ED AVS SNAPSHOT
[facility] Emergency Department    [address] [address]    Phone:  [phone]    Fax:  [phone]                                       Connor August   MRN: [id number]    Department:  [facility] Emergency Department   Date of Visit:  10/15/2018           After Visit Summary Signature Page     I have received my discharge instructions, and my questions have been answered. I have discussed any challenges I see with this plan with the nurse or doctor.    ..........................................................................................................................................  Patient/Patient Representative Signature      ..........................................................................................................................................  Patient Representative Print Name and Relationship to Patient    ..................................................               ................................................  Date                                   Time    ..........................................................................................................................................  Reviewed by Signature/Title    ...................................................              ..............................................  Date                                               Time          22EPIC Rev 08/18

## 2018-10-15 NOTE — LETTER
[facility] EMERGENCY DEPARTMENT  [address] [address]  Phone: [phone]  Fax: [phone]    October 15, 2018        Connor August  [address] [facility]    [place] [facility] [place] [facility] [address]          To whom it may concern:    RE: Connor August    Patient was seen and treated today at our clinic.  Please excuse patient from work tomorrow and Wednesday due to injury.     Please contact me for questions or concerns.      Sincerely,      Lucina Pierce PA-C

## 2018-10-15 NOTE — DISCHARGE INSTRUCTIONS
Rest and elevate the affected painful area as much as possible.    Apply ice for 15-20 minutes intermittently as needed and especially after any offending activity.   Use splint/crutches as directed; use as needed.   Daily stretching to prevent stiffness of joint if no diagnosed fracture diagnosed.  As pain recedes, begin normal activities slowly as tolerated.    Anti-inflammatories/pain relievers like tylenol/acetaminophen or ibuprofen can be very helpful if not allergic to or contraindicated.     The majority of swelling comes in the first 48 hours after an injury.  You can reduce the effects of this by applying cold to the injury immediately after an injury and for at least 20 minutes of every hour as able.  Rest and elevation of the injured area (if possible)     As a general rule, the body heals itself in response to the forces that are applied to it.  In other words, if you just rest, you'll never fully recover. After the initial rest period, gently moving the injured joint (if appropriate) will also help speed ultimate recovery.  If injuries are mild to moderate, you can progress to full range of motion without resistance.  As this becomes easier and more comfortable over the course of days, this area can then begin to be tested carefully with controlled weight-bearing or resistance activities.     If you have additional questions about specific rehabilitation over time, Consider Physical Therapy if symptoms not better with symptomatic care.     Patient advised to follow up with PCP for recheck in 1-2 days.

## 2018-10-15 NOTE — ED AVS SNAPSHOT
[facility]    [address] [facility] [address]    Phone:  [phone]    Fax:  [phone]                                       Connor August   MRN: [id number]    Department:  [facility]   Date of Visit:  10/15/2018           Patient Information     Date Of Birth          1995        Your diagnoses for this visit were:     Contusion of right foot, initial encounter     Sprain of right ankle, unspecified ligament, initial encounter     Contusion of right ankle, initial encounter        You were seen by Lucina Pierce PA-C.      Follow-up Information     Follow up with No Ref-Primary, Physician In 1 week.        Follow up with [facility].    Specialty:  EMERGENCY MEDICINE    Why:  As needed, If symptoms worsen    Contact information:    [address] [place] [id number] [phone]    Additional information:    The medical center is located at   [address] [place] [address] (between [facility] and   [facility] in [place], four miles north   of [place]).        Discharge Instructions       Rest and elevate the affected painful area as much as possible.    Apply ice for 15-20 minutes intermittently as needed and especially after any offending activity.   Use splint/crutches as directed; use as needed.   Daily stretching to prevent stiffness of joint if no diagnosed fracture diagnosed.  As pain recedes, begin normal activities slowly as tolerated.    Anti-inflammatories/pain relievers like tylenol/acetaminophen or ibuprofen can be very helpful if not allergic to or contraindicated.     The majority of swelling comes in the first 48 hours after an injury.  You can reduce the effects of this by applying cold to the injury immediately after an injury and for at least 20 minutes of every hour as able.  Rest and elevation of the injured area (if possible)     As a general rule, the body heals itself in response to the  forces that are applied to it.  In other words, if you just rest, you'll never fully recover. After the initial rest period, gently moving the injured joint (if appropriate) will also help speed ultimate recovery.  If injuries are mild to moderate, you can progress to full range of motion without resistance.  As this becomes easier and more comfortable over the course of days, this area can then begin to be tested carefully with controlled weight-bearing or resistance activities.     If you have additional questions about specific rehabilitation over time, Consider Physical Therapy if symptoms not better with symptomatic care.     Patient advised to follow up with PCP for recheck in 1-2 days.         24 Hour Appointment Hotline       To make an appointment at any [facility], call [phone] ([phone]). If you don't have a family doctor or clinic, we will help you find one. [facility] are conveniently located to serve the needs of you and your family.          ED Discharge Orders     Alum Crutches: Adult       Use gait belt during crutch training.                     Review of your medicines      Our records show that you are taking the medicines listed below. If these are incorrect, please call your family doctor or clinic.        Dose / Directions Last dose taken    omeprazole 20 MG CR capsule   Commonly known as:  priLOSEC   Dose:  20 mg   Quantity:  30 capsule        Take 1 capsule (20 mg) by mouth daily   Refills:  3                Procedures and tests performed during your visit     Foot  XR, G/E 3 views, right    XR Tibia & Fibula Right 2 Views      Orders Needing Specimen Collection     None      Pending Results     Date and Time Order Name Status Description    10/15/2018 1715 XR Tibia & Fibula Right 2 Views Preliminary     10/15/2018 1715 Foot  XR, G/E 3 views, right Preliminary             Pending Culture Results     No orders found from 10/13/2018 to 10/16/2018.            Pending  "Results Instructions     If you had any lab results that were not finalized at the time of your Discharge, you can call the ED Lab Result RN at [phone]. You will be contacted by this team for any positive Lab results or changes in treatment. The nurses are available 7 days a week from 10A to 6:30P.  You can leave a message 24 hours per day and they will return your call.        Test Results From Your Hospital Stay        10/15/2018  5:52 PM      Narrative     XR RIGHT FOOT THREE OR MORE VIEWS   10/15/2018 5:42 PM     HISTORY: Injury to right foot 6 days ago. Rule out fracture.    COMPARISON: None.         Impression     IMPRESSION: No acute fracture or dislocation.         10/15/2018  5:54 PM      Narrative     RIGHT TIBIA AND FIBULA TWO VIEWS     10/15/2018 5:41 PM     HISTORY:  Injury to right ankle/tib/fib; rule out fracture.     COMPARISON: None.         Impression     IMPRESSION: No acute fracture or dislocation.                     Thank you for choosing [facility]       Thank you for choosing [facility] for your care. Our goal is always to provide you with excellent care. Hearing back from our patients is one way we can continue to improve our services. Please take a few minutes to complete the written survey that you may receive in the mail after you visit with us. Thank you!        [employer] Information     [employer] lets you send messages to your doctor, view your test results, renew your prescriptions, schedule appointments and more. To sign up, go to [email or web address] . Click on \"Log in\" on the left side of the screen, which will take you to the Welcome page. Then click on \"Sign up Now\" on the right side of the page.     You will be asked to enter the access code listed below, as well as some personal information. Please follow the directions to create your username and password.     Your access code is: [email or web address]  Expires: 2019  6:08 PM     Your access code will  in 90 days. If you need " help or a new code, please call your [place] clinic or [phone].        Care EveryWhere ID     This is your Care EveryWhere ID. This could be used by other organizations to access your [place] medical records  [id number]        Equal Access to Services     JOSEPH CASTELLANOS : Vanessa Toscano, ryne sterling, jm kaalbonny sparrow, aurora butt. So [facility] [phone].    ATENCIÓN: Si habla español, tiene a landrum disposición servicios gratuitos de asistencia lingüística. Llame al [phone].    We comply with applicable federal civil rights laws and [place] laws. We do not discriminate on the basis of race, color, national origin, age, disability, sex, sexual orientation, or gender identity.            After Visit Summary       This is your record. Keep this with you and show to your community pharmacist(s) and doctor(s) at your next visit.

## 2018-10-15 NOTE — ED PROVIDER NOTES
History     Chief Complaint   Patient presents with     Foot Pain     R foot, pallet fell on foot and then pt stepped through floor vent     HPI  Connor August is a 23 year old male who Connor August is a 23 year old male who sustained a right ankle and foot injury 6 days ago.   Mechanism of injury: patient states he was at work and dropped a pallet on his right ankle/distal tibia/fibula. Patient did inform he work about incident, but did not think it was anything at that time beside a slight bruise and superficial skin abrasion. Patient then went home that day and his right foot fell through the floor vent causing right foot pain.   Immediate symptoms: immediate pain, delayed swelling, was able to bear weight directly after injury, no deformity was noted by the patient, and bruising.   Symptoms have been sudden, worsening since that time.   Prior history of related problems: no prior problems with this area in the past.    Last tetanus was this year.     Patient denies knee pain, numbness, redness, warmth, red streaking or fevers.     Problem list, Medication list, Allergies, and Medical/Social/Surgical histories reviewed in [facility] and updated as appropriate.      Problem List:    Patient Active Problem List    Diagnosis Date Noted     HL (hearing loss) 06/29/2011     Priority: Medium        Past Medical History:    History reviewed. No pertinent past medical history.    Past Surgical History:    Past Surgical History:   Procedure Laterality Date     ESOPHAGOSCOPY, GASTROSCOPY, DUODENOSCOPY (EGD), COMBINED N/A 6/21/2018    Procedure: COMBINED ESOPHAGOSCOPY, GASTROSCOPY, DUODENOSCOPY (EGD);  gastroscopy;  Surgeon: Chalino Pinto MD;  Location: [facility]       Family History:    Family History   Problem Relation Age of Onset     Coronary Artery Disease Mother      Alcoholism Maternal Grandfather      Unknown/Adopted Paternal Grandmother      Unknown/Adopted Paternal Grandfather        Social History:  Marital Status:    [2]  Social History   Substance Use Topics     Smoking status: Never Smoker     Smokeless tobacco: Never Used     Alcohol use Yes      Comment: 2 beers per week        Medications:      omeprazole (PRILOSEC) 20 MG CR capsule         Review of Systems   Musculoskeletal:        Right ankle and foot pain with swelling, superficial skin abrasion, and mild bruising.    Skin: Positive for wound (superficial skin abrasion. ).   Neurological: Negative for weakness and numbness.   All other systems reviewed and are negative.      Physical Exam   BP: 132/79  Pulse: 85  Temp: 97.4  F (36.3  C)  Resp: 20  SpO2: 99 %      Physical Exam   Constitutional: He is oriented to person, place, and time. He appears well-developed and well-nourished. No distress.   Musculoskeletal:        Right ankle: He exhibits decreased range of motion (due to pain with flexion and extension of ankle. ), swelling and ecchymosis. He exhibits no deformity, no laceration and normal pulse. Tenderness. Lateral malleolus, AITFL and head of 5th metatarsal tenderness found. Achilles tendon normal.        Feet:    Neurological: He is alert and oriented to person, place, and time. He has normal strength. No sensory deficit. Gait (limited due to pain. ) abnormal. GCS eye subscore is 4. GCS verbal subscore is 5. GCS motor subscore is 6.   Skin: Skin is warm and dry. Abrasion and bruising (to the lateral malleolus and lateral aspect of the right foot. ) noted. No rash noted. He is not diaphoretic.        Psychiatric: He has a normal mood and affect. His behavior is normal. Judgment and thought content normal.       ED Course     ED Course     Procedures              Critical Care time:  none               Results for orders placed or performed during the hospital encounter of 10/15/18 (from the past 24 hour(s))   XR Tibia & Fibula Right 2 Views    Narrative    RIGHT TIBIA AND FIBULA TWO VIEWS     10/15/2018 5:41 PM     HISTORY:  Injury to right  ankle/tib/fib; rule out fracture.     COMPARISON: None.       Impression    IMPRESSION: No acute fracture or dislocation.         KALYANI STACY MD   Foot  XR, G/E 3 views, right    Narrative    XR RIGHT FOOT THREE OR MORE VIEWS   10/15/2018 5:42 PM     HISTORY: Injury to right foot 6 days ago. Rule out fracture.    COMPARISON: None.       Impression    IMPRESSION: No acute fracture or dislocation.    KALYANI STACY MD       Medications - No data to display    Assessments & Plan (with Medical Decision Making)     I have reviewed the nursing notes.    I have reviewed the findings, diagnosis, plan and need for follow up with the patient.   23-year-old male presents to the urgent care with right ankle and foot pain that started 6 days ago.  Patient states the right ankle pain occurred while at work when he accidentally dropped a 25 pound pallet on his anterior shin and ankle.  Patient states he was seen by the employee health.  Later that day patient states he was at home and his right foot fell through the floor meant.  Patient has had right foot and ankle pain since these injuries occurred.  Positive swelling and bruising noted over the past few days.  Patient states today while at work he noticed sharp pain radiating up his leg due to walking on his right ankle and foot all day.  Patient states he has been icing, elevating, Tylenol and ibuprofen with minimal relief.  Patient concerned he wants to make sure that there is no fracture.  X-ray was obtained in office today and was negative for fracture or dislocation.  Discussed with patient that this is likely an ankle sprain and foot contusion.  Patient given crutches and a work note for the next 2 days off since he is on his feet all day long.  Patient informed to return to the urgent care if that superficial skin abrasion becomes infected, but at this time there are no concerns for cellulitis.  Patient is currently up-to-date with his tetanus.  Patient offered a  air ankle stirrup, but declined in office today.  Patient to continue RICE and Tylenol and ibuprofen as directed.    Discharge Medication List as of 10/15/2018  6:08 PM          Final diagnoses:   Contusion of right foot, initial encounter   Sprain of right ankle, unspecified ligament, initial encounter   Contusion of right ankle, initial encounter       10/15/2018   [facility] EMERGENCY DEPARTMENT     Lucina Pierce PA-C  10/15/18 6777

## 2019-02-12 ENCOUNTER — APPOINTMENT (OUTPATIENT)
Dept: GENERAL RADIOLOGY | Facility: CLINIC | Age: 24
End: 2019-02-12
Attending: NURSE PRACTITIONER

## 2019-02-12 ENCOUNTER — HOSPITAL ENCOUNTER (EMERGENCY)
Facility: CLINIC | Age: 24
Discharge: HOME | End: 2019-02-12
Attending: NURSE PRACTITIONER | Admitting: NURSE PRACTITIONER

## 2019-02-12 VITALS
SYSTOLIC BLOOD PRESSURE: 124 MMHG | OXYGEN SATURATION: 98 % | HEIGHT: 70 IN | BODY MASS INDEX: 30.06 KG/M2 | TEMPERATURE: 97.6 F | DIASTOLIC BLOOD PRESSURE: 79 MMHG | RESPIRATION RATE: 18 BRPM | WEIGHT: 210 LBS

## 2019-02-12 DIAGNOSIS — J02.0 ACUTE STREPTOCOCCAL PHARYNGITIS: ICD-10-CM

## 2019-02-12 DIAGNOSIS — J06.9 VIRAL URI WITH COUGH: ICD-10-CM

## 2019-02-12 LAB
INTERNAL QC OK POCT: YES
S PYO AG THROAT QL IA.RAPID: POSITIVE

## 2019-02-12 PROCEDURE — 71046 X-RAY EXAM CHEST 2 VIEWS: CPT

## 2019-02-12 PROCEDURE — 87880 STREP A ASSAY W/OPTIC: CPT | Performed by: NURSE PRACTITIONER

## 2019-02-12 PROCEDURE — 99214 OFFICE O/P EST MOD 30 MIN: CPT | Mod: Z6 | Performed by: NURSE PRACTITIONER

## 2019-02-12 PROCEDURE — G0463 HOSPITAL OUTPT CLINIC VISIT: HCPCS | Mod: 25

## 2019-02-12 PROCEDURE — G0463 HOSPITAL OUTPT CLINIC VISIT: HCPCS | Performed by: NURSE PRACTITIONER

## 2019-02-12 RX ORDER — AMOXICILLIN 500 MG/1
500 CAPSULE ORAL 2 TIMES DAILY
Qty: 20 CAPSULE | Refills: 0 | Status: SHIPPED | OUTPATIENT
Start: 2019-02-12 | End: 2019-02-22

## 2019-02-12 ASSESSMENT — ENCOUNTER SYMPTOMS
SORE THROAT: 1
RHINORRHEA: 1
FEVER: 1
CHILLS: 1
COUGH: 1
SHORTNESS OF BREATH: 0
DIZZINESS: 0
HEADACHES: 0

## 2019-02-12 ASSESSMENT — MIFFLIN-ST. JEOR: SCORE: 1958.8

## 2019-02-12 NOTE — ED PROVIDER NOTES
History     Chief Complaint   Patient presents with     Cough     productive cough x 1 week-has had a st that is getting better     HPI  Connor August is a 23 year old male who presents to urgent care for evaluation of sore throat, cough, and chills. Symptoms started 1 week ago. Nausea and vomiting at the onset. Neck pain and pain with swallowing, but this is starting to improve. Drenched in sweat during the night. Starting to have a productive cough.    Allergies:  Allergies   Allergen Reactions     Nka [No Known Allergies]        Problem List:    Patient Active Problem List    Diagnosis Date Noted     HL (hearing loss) 06/29/2011     Priority: Medium        Past Medical History:    No past medical history on file.    Past Surgical History:    Past Surgical History:   Procedure Laterality Date     ESOPHAGOSCOPY, GASTROSCOPY, DUODENOSCOPY (EGD), COMBINED N/A 6/21/2018    Procedure: COMBINED ESOPHAGOSCOPY, GASTROSCOPY, DUODENOSCOPY (EGD);  gastroscopy;  Surgeon: Chalino Pinto MD;  Location: [facility]       Family History:    Family History   Problem Relation Age of Onset     Coronary Artery Disease Mother      Alcoholism Maternal Grandfather      Unknown/Adopted Paternal Grandmother      Unknown/Adopted Paternal Grandfather        Social History:  Marital Status:   [2]  Social History     Tobacco Use     Smoking status: Never Smoker     Smokeless tobacco: Never Used   Substance Use Topics     Alcohol use: Yes     Comment: 2 beers per week     Drug use: No        Medications:      amoxicillin (AMOXIL) 500 MG capsule   omeprazole (PRILOSEC) 20 MG CR capsule         Review of Systems   Constitutional: Positive for chills and fever.   HENT: Positive for congestion, rhinorrhea and sore throat. Negative for ear pain.    Respiratory: Positive for cough. Negative for shortness of breath.    Cardiovascular: Negative for chest pain.   Skin: Negative for rash.   Neurological: Negative for dizziness and headaches.  "      Physical Exam   BP: 124/79  Heart Rate: 87  Temp: 97.6  F (36.4  C)  Resp: 18  Height: 177.8 cm (5' 10\")  Weight: 95.3 kg (210 lb)  SpO2: 98 %      Physical Exam    ED Course        Procedures               Results for orders placed or performed during the hospital encounter of 02/12/19 (from the past 24 hour(s))   XR Chest 2 Views    Narrative    CHEST TWO VIEWS February 12, 2019 12:15 PM     HISTORY: Cough.     COMPARISON: None available      Impression    IMPRESSION: No acute pulmonary disease.   Rapid strep group A screen POCT   Result Value Ref Range    Rapid Strep A Screen positive neg    Internal QC OK Yes        Medications - No data to display    Assessments & Plan (with Medical Decision Making)   RST positive.  Chest xray is negative. I discussed with patient treatment for Strep Throat with antibiotics. Likely also has a viral uri with cough. Patient will be initiated on antibiotics. Rx for amoxicillin provided for strep. Patient  notified contagious for 24 hours after initiating antibiotics.  Follow up with PCP if no improvement in 3-5 days.  Worrisome reasons to seek care sooner discussed.      I have reviewed the nursing notes.    I have reviewed the findings, diagnosis, plan and need for follow up with the patient.       Medication List      Started    amoxicillin 500 MG capsule  Commonly known as:  AMOXIL  500 mg, Oral, 2 TIMES DAILY            Final diagnoses:   Acute streptococcal pharyngitis   Viral URI with cough       2/12/2019   [facility] EMERGENCY DEPARTMENT     Evelyn Marino APRN CNP  02/12/19 1235    "

## 2019-02-12 NOTE — ED AVS SNAPSHOT
[facility] Emergency Department  [address] [facility] [address]  Phone:  [phone]  Fax:  [phone]                                    Connor August   MRN: [id number]    Department:  [facility] Emergency Department   Date of Visit:  2/12/2019           After Visit Summary Signature Page    I have received my discharge instructions, and my questions have been answered. I have discussed any challenges I see with this plan with the nurse or doctor.    ..........................................................................................................................................  Patient/Patient Representative Signature      ..........................................................................................................................................  Patient Representative Print Name and Relationship to Patient    ..................................................               ................................................  Date                                   Time    ..........................................................................................................................................  Reviewed by Signature/Title    ...................................................              ..............................................  Date                                               Time          22EPIC Rev 08/18

## 2022-04-07 ENCOUNTER — HOSPITAL ENCOUNTER (EMERGENCY)
Facility: CLINIC | Age: 27
Discharge: HOME | End: 2022-04-07
Attending: NURSE PRACTITIONER | Admitting: NURSE PRACTITIONER
Payer: OTHER MISCELLANEOUS

## 2022-04-07 VITALS
WEIGHT: 230 LBS | BODY MASS INDEX: 32.93 KG/M2 | TEMPERATURE: 98 F | DIASTOLIC BLOOD PRESSURE: 86 MMHG | HEART RATE: 92 BPM | OXYGEN SATURATION: 100 % | SYSTOLIC BLOOD PRESSURE: 137 MMHG | RESPIRATION RATE: 16 BRPM | HEIGHT: 70 IN

## 2022-04-07 DIAGNOSIS — S06.0X9A CONCUSSION WITH LOSS OF CONSCIOUSNESS, INITIAL ENCOUNTER: ICD-10-CM

## 2022-04-07 DIAGNOSIS — M62.838 TRAPEZIUS MUSCLE SPASM: ICD-10-CM

## 2022-04-07 DIAGNOSIS — R20.2 PARESTHESIA: ICD-10-CM
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
## 2022-04-07 DIAGNOSIS — M54.2 CERVICALGIA: ICD-10-CM

## 2022-04-07 PROCEDURE — 99284 EMERGENCY DEPT VISIT MOD MDM: CPT | Performed by: NURSE PRACTITIONER

## 2022-04-07 PROCEDURE — 250N000013 HC RX MED GY IP 250 OP 250 PS 637: Performed by: NURSE PRACTITIONER

## 2022-04-07 RX ORDER — DOCUSATE SODIUM 100 MG/1
200 CAPSULE, LIQUID FILLED ORAL 2 TIMES DAILY PRN
Qty: 100 CAPSULE | Refills: 0 | Status: SHIPPED | OUTPATIENT
Start: 2022-04-07 | End: 2022-05-03

## 2022-04-07 RX ORDER — ACETAMINOPHEN 500 MG
1000 TABLET ORAL ONCE
Status: COMPLETED | OUTPATIENT
Start: 2022-04-07 | End: 2022-04-07

## 2022-04-07 RX ORDER — ACETAMINOPHEN 500 MG
1000 TABLET ORAL EVERY 6 HOURS PRN
Qty: 120 TABLET | Refills: 0 | Status: SHIPPED | OUTPATIENT
Start: 2022-04-07 | End: 2022-04-14

## 2022-04-07 RX ORDER — OXYCODONE HYDROCHLORIDE 5 MG/1
5 TABLET ORAL EVERY 6 HOURS PRN
Qty: 12 TABLET | Refills: 0 | Status: SHIPPED | OUTPATIENT
Start: 2022-04-07 | End: 2022-04-10

## 2022-04-07 RX ORDER — OXYCODONE HYDROCHLORIDE 5 MG/1
10 TABLET ORAL ONCE
Status: COMPLETED | OUTPATIENT
Start: 2022-04-07 | End: 2022-04-07

## 2022-04-07 RX ORDER — CELECOXIB 200 MG/1
200 CAPSULE ORAL 2 TIMES DAILY PRN
Qty: 30 CAPSULE | Refills: 0 | Status: SHIPPED | OUTPATIENT
Start: 2022-04-07 | End: 2022-05-03

## 2022-04-07 RX ADMIN — ACETAMINOPHEN 1000 MG: 500 TABLET, FILM COATED ORAL at 15:32

## 2022-04-07 RX ADMIN — OXYCODONE HYDROCHLORIDE 10 MG: 5 TABLET ORAL at 15:32
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
## 2022-04-07 NOTE — DISCHARGE INSTRUCTIONS
I recommend stop your current ibuprofen.  Instead start Celebrex and take this 1 capsule by mouth twice daily as needed for pain.  I also recommend stopping the Robaxin.  Instead take Tylenol 2 extra strength tablets by mouth every 6 hours or 4 times a day.  This should be taken 4 times daily.  When pain is more severe you may take 1 oxycodone every 6 to 12 to 24 hours as needed for severe pain.  Oxycodone is a narcotic and highly addictive.  It can cause severe constipation.  I recommend starting Colace and take 2 capsules by mouth twice daily.  Be sure that you are drinking plenty of fluids.  Walking as much as possible.  Avoid uneven ground.  Follow-up with orthopedics for ongoing care.  An MRI has been ordered for you on an outpatient basis.  You can check with radiology and when it is approved you may proceed to radiology for the MRI.  Please follow-up with orthopedics for the results of the MRI.

## 2022-04-11 ENCOUNTER — HOSPITAL ENCOUNTER (OUTPATIENT)
Dept: MRI IMAGING | Facility: CLINIC | Age: 27
Discharge: HOME | End: 2022-04-11
Attending: NURSE PRACTITIONER | Admitting: NURSE PRACTITIONER
Payer: OTHER MISCELLANEOUS

## 2022-04-11 DIAGNOSIS — M54.2 CERVICALGIA: ICD-10-CM

## 2022-04-11 DIAGNOSIS — R20.2 PARESTHESIA: ICD-10-CM

## 2022-04-11 DIAGNOSIS — M62.838 TRAPEZIUS MUSCLE SPASM: ICD-10-CM
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
## 2022-04-11 PROCEDURE — 72141 MRI NECK SPINE W/O DYE: CPT

## 2022-04-14 ENCOUNTER — OFFICE VISIT (OUTPATIENT)
Dept: FAMILY MEDICINE | Facility: CLINIC | Age: 27
End: 2022-04-14

## 2022-04-14 VITALS
DIASTOLIC BLOOD PRESSURE: 80 MMHG | TEMPERATURE: 98.7 F | WEIGHT: 233 LBS | HEIGHT: 70 IN | SYSTOLIC BLOOD PRESSURE: 134 MMHG | HEART RATE: 88 BPM | BODY MASS INDEX: 33.36 KG/M2

## 2022-04-14 DIAGNOSIS — S39.012D STRAIN OF LUMBAR REGION, SUBSEQUENT ENCOUNTER: Primary | ICD-10-CM

## 2022-04-14 DIAGNOSIS — M54.12 CERVICAL RADICULOPATHY: ICD-10-CM

## 2022-04-14 PROCEDURE — 99204 OFFICE O/P NEW MOD 45 MIN: CPT | Performed by: NURSE PRACTITIONER

## 2022-04-14 ASSESSMENT — PAIN SCALES - GENERAL: PAINLEVEL: EXTREME PAIN (9)

## 2022-04-14 NOTE — LETTER
[facility]  [address] [place] [address]  Phone: [phone]  Fax: [phone]    April 14, 2022        Connor August  [address]    [facility] [address]          To whom it may concern:    RE: Connor August    Patient may return to work  with the following:  Light duty-unable to lift more than 20 pounds for 2 weeks.    Please contact me for questions or concerns.      Sincerely,        SANTA Laboy CNP
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
## 2022-04-19 ENCOUNTER — HOSPITAL ENCOUNTER (OUTPATIENT)
Dept: PHYSICAL THERAPY | Facility: CLINIC | Age: 27
Setting detail: THERAPIES SERIES
Discharge: HOME | End: 2022-04-19
Attending: NURSE PRACTITIONER
Payer: OTHER MISCELLANEOUS

## 2022-04-19 DIAGNOSIS — M54.12 CERVICAL RADICULOPATHY: ICD-10-CM

## 2022-04-19 DIAGNOSIS — S39.012D STRAIN OF LUMBAR REGION, SUBSEQUENT ENCOUNTER: ICD-10-CM

## 2022-04-19 PROCEDURE — 97161 PT EVAL LOW COMPLEX 20 MIN: CPT | Mod: GP | Performed by: PHYSICAL THERAPIST

## 2022-04-19 PROCEDURE — 97140 MANUAL THERAPY 1/> REGIONS: CPT | Mod: GP | Performed by: PHYSICAL THERAPIST

## 2022-04-19 PROCEDURE — 97110 THERAPEUTIC EXERCISES: CPT | Mod: GP | Performed by: PHYSICAL THERAPIST
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
## 2022-04-28 ENCOUNTER — HOSPITAL ENCOUNTER (OUTPATIENT)
Dept: PHYSICAL THERAPY | Facility: CLINIC | Age: 27
Setting detail: THERAPIES SERIES
Discharge: HOME | End: 2022-04-28
Attending: NURSE PRACTITIONER
Payer: OTHER MISCELLANEOUS

## 2022-04-28 PROCEDURE — 97110 THERAPEUTIC EXERCISES: CPT | Mod: GP | Performed by: PHYSICAL THERAPIST

## 2022-04-28 PROCEDURE — 97140 MANUAL THERAPY 1/> REGIONS: CPT | Mod: GP | Performed by: PHYSICAL THERAPIST

## 2022-05-03 ENCOUNTER — OFFICE VISIT (OUTPATIENT)
Dept: FAMILY MEDICINE | Facility: CLINIC | Age: 27
End: 2022-05-03
Payer: OTHER MISCELLANEOUS

## 2022-05-03 VITALS
TEMPERATURE: 98.8 F | HEIGHT: 70 IN | DIASTOLIC BLOOD PRESSURE: 84 MMHG | WEIGHT: 237 LBS | BODY MASS INDEX: 33.93 KG/M2 | SYSTOLIC BLOOD PRESSURE: 132 MMHG | HEART RATE: 92 BPM | RESPIRATION RATE: 16 BRPM

## 2022-05-03 DIAGNOSIS — M54.12 CERVICAL RADICULOPATHY: ICD-10-CM

## 2022-05-03 DIAGNOSIS — S39.012D STRAIN OF LUMBAR REGION, SUBSEQUENT ENCOUNTER: Primary | ICD-10-CM

## 2022-05-03 PROCEDURE — 99214 OFFICE O/P EST MOD 30 MIN: CPT | Performed by: NURSE PRACTITIONER

## 2022-05-03 RX ORDER — METHOCARBAMOL 500 MG/1
500 TABLET, FILM COATED ORAL 3 TIMES DAILY
Qty: 60 TABLET | Refills: 0 | Status: SHIPPED | OUTPATIENT
Start: 2022-05-03 | End: 2024-02-05

## 2022-05-03 RX ORDER — METHOCARBAMOL 500 MG/1
TABLET, FILM COATED ORAL
COMMUNITY
Start: 2022-04-05 | End: 2022-05-03

## 2022-05-03 ASSESSMENT — PAIN SCALES - GENERAL: PAINLEVEL: SEVERE PAIN (6)

## 2022-05-03 NOTE — LETTER
[facility]  [address] [place] [id number]  Phone: [phone]  Fax: [phone]    May 3, 2022        Connor August  [address]    [facility] [address]          To whom it may concern:    RE: Connor August    Patient was seen and treated today at our clinic and missed work.  Patient may return to work 5/3/2022 with the following:  Light duty-unable to lift more than 20 pounds for the next 4 weeks.    Please contact me for questions or concerns.      Sincerely,        SANTA Laboy CNP
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
## 2022-05-06 ENCOUNTER — HOSPITAL ENCOUNTER (OUTPATIENT)
Dept: PHYSICAL THERAPY | Facility: CLINIC | Age: 27
Setting detail: THERAPIES SERIES
Discharge: HOME | End: 2022-05-06
Attending: NURSE PRACTITIONER
Payer: OTHER MISCELLANEOUS

## 2022-05-06 PROCEDURE — 97140 MANUAL THERAPY 1/> REGIONS: CPT | Mod: GP | Performed by: PHYSICAL THERAPIST

## 2022-05-06 PROCEDURE — 97110 THERAPEUTIC EXERCISES: CPT | Mod: GP | Performed by: PHYSICAL THERAPIST

## 2022-05-11 ENCOUNTER — HOSPITAL ENCOUNTER (OUTPATIENT)
Dept: PHYSICAL THERAPY | Facility: CLINIC | Age: 27
Setting detail: THERAPIES SERIES
Discharge: HOME | End: 2022-05-11
Attending: NURSE PRACTITIONER
Payer: OTHER MISCELLANEOUS

## 2022-05-11 ENCOUNTER — TELEPHONE (OUTPATIENT)
Dept: FAMILY MEDICINE | Facility: CLINIC | Age: 27
End: 2022-05-11

## 2022-05-11 PROCEDURE — 97110 THERAPEUTIC EXERCISES: CPT | Mod: GP | Performed by: PHYSICAL THERAPIST

## 2022-05-11 NOTE — TELEPHONE ENCOUNTER
Connor says he was cleared by physical therapy today and needs a note from you to return to work with no restrictions. Please call when ready

## 2022-05-11 NOTE — PROGRESS NOTES
Connor August   PHYSICAL THERAPY PROGRESS/discharge NOTE  05/11/22 1500   Signing Clinician's Name / Credentials   Signing clinician's name / credentials Kris Hoenk, PT   Session Number   Session Number 4 WC seen from 4/19-5/11/22   Adult Goals   PT Ortho Eval Goals 1;2;3   Ortho Goal 1   Goal Identifier 1   Goal Description pt will have full painfree LROM for increased ADL/household tolerance in 4wk   Target Date 05/18/22   Date Met 05/11/22   Ortho Goal 2   Goal Identifier 2   Goal Description pt will be able to lift as needed for RTW in 8wk   Goal Progress doing more not that heavy   Target Date 06/15/22   Ortho Goal 3   Goal Identifier 3   Goal Description pt will be omar to lift and play with his kids w/o sx in 8wk   Target Date 06/15/22   Date Met 05/11/22   Subjective Report   Subjective Report ready to go back to work, bored at home, doing more around house.   Objective Measures   Objective Measures Objective Measure 1   Objective Measure 1   Objective Measure LROM WNL   Details see weights listed below   Treatment Interventions   Interventions Manual Therapy;Therapeutic Procedure/Exercise   Therapeutic Procedure/exercise   Therapeutic Procedures: strength, endurance, ROM, flexibillity minutes (26468) 25   Skilled Intervention work sim lifitng, pushing testing weight limits   Treatment Detail dual column rows 30# x20, lat pull37# x20, pushing 26# x10, side step outs 20# x5B, box lift floor to waist 45# x5, front carry 45# 50ft x3, floor to waist 70# x3, 120# x5, 140# x3   Plan   Home program ex lsited   Updates to plan of care pt feels ready to return to work, lifting boxes a s listed above without increase in low back pain   Plan for next session pt to contact MD for earlier return to work, will hold open 30days   Total Session Time   Timed Code Treatment Minutes 25   Total Treatment Time (sum of timed and untimed services) 25   AMBULATORY CLINICS ONLY-MEDICAL AND TREATMENT DIAGNOSIS   Medical  Diagnosis LBP, neck pain   PT Diagnosis back pain, muscular tightness   [facility]  Kris Hoenk  PT  [facility]  [address] [place] [address]  [email or web address]   Office: [phone]  Voicemail: [phone]

## 2022-05-11 NOTE — LETTER
[facility]  [address] [place] [id number]  Phone: [phone]  Fax: [phone]    May 12, 2022        DillanVARSHA August  [address] [facility]    [facility] [address]          To whom it may concern:    RE: Connor August    Patient may return to work  with the following:  No working or lifting restrictions    Please contact me for questions or concerns.      Sincerely,        SANTA Laboy CNP

## 2022-05-12 NOTE — TELEPHONE ENCOUNTER
Pt will try the letter on My Chart first.  Mailed signed copy to pt.  Amber [facility]

## 2022-05-12 NOTE — TELEPHONE ENCOUNTER
Notify patient I have written his return to work note. Verify how he would like to obtain note.    Tiffanie Christensen CNP

## 2022-05-14 ENCOUNTER — HEALTH MAINTENANCE LETTER (OUTPATIENT)
Age: 27
End: 2022-05-14

## 2023-01-14 ENCOUNTER — HEALTH MAINTENANCE LETTER (OUTPATIENT)
Age: 28
End: 2023-01-14

## 2023-06-02 ENCOUNTER — HEALTH MAINTENANCE LETTER (OUTPATIENT)
Age: 28
End: 2023-06-02

## 2024-02-05 ENCOUNTER — OFFICE VISIT (OUTPATIENT)
Dept: FAMILY MEDICINE | Facility: CLINIC | Age: 29
End: 2024-02-05
Payer: COMMERCIAL

## 2024-02-05 ENCOUNTER — ANCILLARY PROCEDURE (OUTPATIENT)
Dept: GENERAL RADIOLOGY | Facility: CLINIC | Age: 29
End: 2024-02-05
Attending: NURSE PRACTITIONER
Payer: COMMERCIAL

## 2024-02-05 VITALS
WEIGHT: 235.2 LBS | OXYGEN SATURATION: 97 % | BODY MASS INDEX: 36.91 KG/M2 | TEMPERATURE: 98.4 F | SYSTOLIC BLOOD PRESSURE: 126 MMHG | DIASTOLIC BLOOD PRESSURE: 80 MMHG | RESPIRATION RATE: 24 BRPM | HEIGHT: 67 IN | HEART RATE: 80 BPM

## 2024-02-05 DIAGNOSIS — S99.911A ANKLE INJURY, RIGHT, INITIAL ENCOUNTER: ICD-10-CM

## 2024-02-05 DIAGNOSIS — S99.911A ANKLE INJURY, RIGHT, INITIAL ENCOUNTER: Primary | ICD-10-CM

## 2024-02-05 PROCEDURE — 73610 X-RAY EXAM OF ANKLE: CPT | Mod: TC | Performed by: RADIOLOGY

## 2024-02-05 PROCEDURE — 99213 OFFICE O/P EST LOW 20 MIN: CPT | Performed by: NURSE PRACTITIONER

## 2024-02-05 PROCEDURE — 73630 X-RAY EXAM OF FOOT: CPT | Mod: TC | Performed by: RADIOLOGY

## 2024-02-05 ASSESSMENT — PAIN SCALES - GENERAL: PAINLEVEL: EXTREME PAIN (8)

## 2024-02-05 NOTE — PROGRESS NOTES
"  Assessment & Plan     Ankle injury, right, initial encounter  Radiologist read the x-rays as negative for fracture.  This is likely a sprain  - XR Ankle Right G/E 3 Views; Future  - XR Foot Right G/E 3 Views; Future    Continue to wear the boot this week.  Use the crutches if it is painful to bear weight on your right foot.  If your pain improves by the weekend, you can start to take off the boot.  If you feel you are able to return to your regular work duties next week, let me know if you need a letter.  Ice your ankle for 15 minutes several times a day.  Ibuprofen 600 mg every 6 hours if needed for pain    Follow-up if no improvement in 2 weeks.  Kaylyn Bonifacio, CHRISTIAN                Subjective   Connor is a 28 year old, presenting for the following health issues:  Musculoskeletal Problem (Right Foot/Ankle Injury last night )        2/5/2024     1:49 PM   Additional Questions   Roomed by Simón JANSEN CMA   Accompanied by Wife- Megan     Musculoskeletal Problem    History of Present Illness       Reason for visit:  Foot injury  Symptom onset:  1-3 days ago  Symptom intensity:  Severe  Symptom progression:  Staying the same  Had these symptoms before:  No  What makes it worse:  Walking and pickingup my right foot  What makes it better:  Not really hurt anyway i put it in    He eats 0-1 servings of fruits and vegetables daily.He consumes 2 sweetened beverage(s) daily.He exercises with enough effort to increase his heart rate 20 to 29 minutes per day.  He exercises with enough effort to increase his heart rate 5 days per week.   He is taking medications regularly.                   Review of Systems  Constitutional, HEENT, cardiovascular, pulmonary, gi and gu systems are negative, except as otherwise noted.      Objective    /80 (BP Location: Right arm, Patient Position: Sitting, Cuff Size: Adult Large)   Pulse 80   Temp 98.4  F (36.9  C) (Tympanic)   Resp 24   Ht 1.708 m (5' 7.25\")   Wt 106.7 kg (235 lb 3.2 oz) "   SpO2 97%   BMI 36.56 kg/m    Body mass index is 36.56 kg/m .  Physical Exam   GENERAL: alert and no distress  MS: Right ankle and foot: Tenderness, edema and bruising noted over the lateral malleolus, lateral heel, and across the dorsal surface of the proximal metatarsal heads.  Difficulty bearing weight; asked for a wheelchair ride to x-ray.  Pain with inversion, normal eversion.  Pain with dorsiflexion.    XR Ankle Right G/E 3 Views    Result Date: 2/5/2024  XR FOOT RIGHT G/E 3 VIEWS, XR ANKLE RIGHT G/E 3 VIEWS 2/5/2024 2:31 PM HISTORY: pain at the lateral malleolus and proximal 5th and 4th metatarsals; Ankle injury, right, initial encounter COMPARISON: None.     IMPRESSION: The right ankle is negative for fracture or disruption of the ankle mortise. The right foot is negative for fracture. MAYRA RENAE MD   SYSTEM ID:  [id number]    XR Foot Right G/E 3 Views    Result Date: 2/5/2024  XR FOOT RIGHT G/E 3 VIEWS, XR ANKLE RIGHT G/E 3 VIEWS 2/5/2024 2:31 PM HISTORY: pain at the lateral malleolus and proximal 5th and 4th metatarsals; Ankle injury, right, initial encounter COMPARISON: None.     IMPRESSION: The right ankle is negative for fracture or disruption of the ankle mortise. The right foot is negative for fracture. MAYRA RENAE MD   SYSTEM ID:  [id number]         Signed Electronically by: SANTA Negro CNP

## 2024-02-05 NOTE — PROGRESS NOTES
"Subjective    HPI:  Connor is a 28 year old male that presents with right ankle pain. He was playing hide-and-go-seek with his children last night when he jumped out of the bathtub and landed in a bin full of bath toys. He does not remember hearing a click, pop, or snap upon landing but believes he internally twisted his right ankle. The pain is located on the outside of her right foot and ankle and wraps around to the top of his foot. The pain feels severe and has been staying constant. He is able to bear weight but it causes pain.The pain is worse with picking up his right foot off the ground and walking. He has been using ice and Tylenol to help with the pain. Denies any numbness or tingling, any changes in sensation, and changes in temperature of his foot.      Answers submitted by the patient for this visit:  Provider Visit on 2/5/2024  2:00 PM with Kaylyn Valdovinos  General Concern (Submitted on 2/5/2024)  Chief Complaint: Chronic problems general questions HPI Form  What is the reason for your visit today?: foot injury  When did your symptoms begin?: 1-3 days ago  How would you describe these symptoms?: Severe  Are your symptoms:: Staying the same  Have you had these symptoms before?: No  Is there anything that makes you feel worse?: walking and pickingup my right foot  Is there anything that makes you feel better?: not really hurt anyway i put it in    ROS:   GEN: Denies fever, chills, and fatigue.   RESP: Denies shortness of breath and cough.  CV: Denies chest pain and palpitations.   MSK: Positive for right foot injury, swelling, and pain.   NEURO: Denies numbness and tingling. Denies dizziness.  SKIN: Denies open lesions, wounds, or rashes. Positive for right foot bruising.   All other systems negative, unless stated above.       Objective  /80 (BP Location: Right arm, Patient Position: Sitting, Cuff Size: Adult Large)  Pulse 80  Temp 98.4  F (36.9  C) (Tympanic)  Resp 24  Ht 1.708 m (5' 7.25\")  " Wt 106.7 kg (235 lb 3.2 oz)  SpO2 97%  BMI 36.56 kg/m      Physical Exam:  GEN: Alert and in no acute distress.   RESP: Lungs clear to auscultation without wheezes, rhonchi, or rales.   CV: Regular rate and rhythm. S1 and S2 present without murmur, gallop, or rub. 1+ pitting edema on right foot and ankle. 2+ DP and PT pulse on right foot. Capillary refill less than 3 seconds.  MSK: Lateral aspect of right foot, lateral malleolus, and superior portion of right foot 1+ pitting edema and ecchymosis. Tenderness to palpation on superior portion of right foot and lateral aspect of right foot. Limited active range of motion on right ankle. Pain with right ankle inversion and plantar flexion. No pain with eversion. Unable to perform right dorsiflexion due to pain. Strength 4/5 for plantar flexion on right ankle. Left ankle WNL.   SKIN: Intact with swelling or ecchymosis on lateral malleolus, lateral aspect of right the foot, and superior portion of the right foot. No visible lesions, rashes, or open wounds. Warm to touch and dry.   NEURO: Sensation intact on right foot.   PSYCH: Cooperative and pleasant. Appropriate responses.     Assessment and Plan:    (K12.186H) Ankle injury, right, initial encounter  (primary encounter diagnosis)  Comment: Concern for fx versus ankle sprain given mechanism of injury. Xray results were negative for fracture.  Plan:   - XR Ankle Right G/E 3 Views, XR Foot Right G/E 3 Views  - Right walking boot to stabilize foot for 1 week         Note by Paulo Quintero RN, DNP Student

## 2024-02-05 NOTE — LETTER
February 5, 2024      RE: Connor August  [address]    [facility] [address]        To Whom It May Concern:    Connor August was seen on 2/5/2024. Connor needs to wear a immobilizing boot on his right foot for at least one week. He is unable to stand at work this week. He would be able to do tasks from a sitting position.    Sincerely,          SANTA Negro CNP

## 2024-07-06 ENCOUNTER — HEALTH MAINTENANCE LETTER (OUTPATIENT)
Age: 29
End: 2024-07-06

## 2025-01-21 ENCOUNTER — ANCILLARY PROCEDURE (OUTPATIENT)
Dept: GENERAL RADIOLOGY | Facility: CLINIC | Age: 30
End: 2025-01-21
Attending: NURSE PRACTITIONER
Payer: COMMERCIAL

## 2025-01-21 ENCOUNTER — OFFICE VISIT (OUTPATIENT)
Dept: FAMILY MEDICINE | Facility: CLINIC | Age: 30
End: 2025-01-21
Payer: COMMERCIAL

## 2025-01-21 ENCOUNTER — NURSE TRIAGE (OUTPATIENT)
Dept: NURSING | Facility: CLINIC | Age: 30
End: 2025-01-21
Payer: COMMERCIAL

## 2025-01-21 VITALS
HEIGHT: 67 IN | WEIGHT: 236 LBS | SYSTOLIC BLOOD PRESSURE: 128 MMHG | BODY MASS INDEX: 37.04 KG/M2 | TEMPERATURE: 98.1 F | DIASTOLIC BLOOD PRESSURE: 86 MMHG | OXYGEN SATURATION: 97 % | HEART RATE: 86 BPM | RESPIRATION RATE: 22 BRPM

## 2025-01-21 DIAGNOSIS — R05.1 ACUTE COUGH: Primary | ICD-10-CM

## 2025-01-21 DIAGNOSIS — R05.1 ACUTE COUGH: ICD-10-CM

## 2025-01-21 PROCEDURE — 71046 X-RAY EXAM CHEST 2 VIEWS: CPT | Mod: TC | Performed by: RADIOLOGY

## 2025-01-21 PROCEDURE — 99214 OFFICE O/P EST MOD 30 MIN: CPT | Performed by: NURSE PRACTITIONER

## 2025-01-21 ASSESSMENT — PAIN SCALES - GENERAL: PAINLEVEL_OUTOF10: SEVERE PAIN (7)

## 2025-01-21 NOTE — TELEPHONE ENCOUNTER
Wife calling with patient. Family is getting over the flu. Patient is on day 5 of symptoms and is having chest pain and is coughing up a little bit of blood with the mucus. Asked to speak with patient to triage. Chest pain is rating 6/10 and is felt in the middle of the chest mainly with coughing. When not coughing his chest feels irritated. Some blood tinged sputum also seen. Taking a deep breath causes chest pain.   Protocol recommends ED now  Patient refusing ED and asking for appointment. Patient has children home from school today and requesting clinic visit.   Care advice given. Transferred to scheduling.   Neena Barillas RN   01/21/25 6:57 AM  [facility] Nurse Advisor            Reason for Disposition   [1] Difficulty breathing AND [2] not severe AND [3] not from stuffy nose (e.g., not relieved by cleaning out the nose)    Additional Information   Negative: SEVERE difficulty breathing (e.g., struggling for each breath, speaks in single words)   Negative: Difficult to awaken or acting confused (e.g., disoriented, slurred speech)   Negative: Bluish (or gray) lips or face now   Negative: Shock suspected (e.g., cold/pale/clammy skin, too weak to stand, low BP, rapid pulse)   Negative: Sounds like a life-threatening emergency to the triager   Negative: Chest pain  (Exception: MILD central chest pain, present only when coughing.)   Negative: [1] Headache AND [2] stiff neck (can't touch chin to chest)   Negative: Fever > 104 F (40 C)    Protocols used: [facility]

## 2025-01-21 NOTE — LETTER
2025    Connor August   1995        To Whom it May Concern;    Please excuse Connor August from work/school for a healthcare visit on 2025.    Sincerely,        SANTA Rojo CNP

## 2025-01-21 NOTE — PROGRESS NOTES
"  Assessment & Plan     Acute cough  No acute distress with ongoing symptoms x-ray completed today which was suggestive of a viral or reactive airway cause of symptoms.  Plan to monitor at this time and follow with any worsening or ongoing symptoms.  Symptomatic care and follow up discussed  - XR Chest 2 Views; Future    Subjective   Connor is a 29 year old, presenting for the following health issues:  Illness (Pt reports influenza went around the house and pt has been sick for 5 days. Has been coughing up mucus with tinge of blood. )        1/21/2025     7:45 AM   Additional Questions   Roomed by Radha MADDOX   Accompanied by self         1/21/2025     7:45 AM   Patient Reported Additional Medications   Patient reports taking the following new medications no new meds     History of Present Illness       Reason for visit:  Chest pain from coughing   He is taking medications regularly.       Acute Illness  Acute illness concerns: Believe the family had influenza- coughing up mucus and having chest pain   Onset/Duration: about 5 days   Symptoms:  Fever: YES  Chills/Sweats: YES  Headache (location?): YES  Sinus Pressure: YES  Conjunctivitis:  No  Ear Pain: YES: bilateral  Rhinorrhea: YES  Congestion: No  Sore Throat: No  Cough: YES-productive of yellow and red sputum, productive of green sputum, with shortness of breath, barking  Wheeze: YES  Decreased Appetite: YES  Nausea: No  Vomiting: No  Diarrhea: No  Dysuria/Freq.: No  Dysuria or Hematuria: No  Fatigue/Achiness: YES  Sick/Strep Exposure: family is sick   Therapies tried and outcome: OTC honey daytime cold and flu, tylenol, ibuprofen, Delvin's    Symptoms improving however chest tightness and stringy blood streaks     Review of Systems  Constitutional, HEENT, cardiovascular, pulmonary, gi and gu systems are negative, except as otherwise noted.      Objective    /86   Pulse 86   Temp 98.1  F (36.7  C) (Tympanic)   Resp 22   Ht 1.708 m (5' 7.25\")   Wt 107 kg " (236 lb)   SpO2 97%   BMI 36.69 kg/m    Body mass index is 36.69 kg/m .  Physical Exam   GENERAL: alert and no distress  HENT: ear canals and TM's normal, nose and mouth without ulcers or lesions  NECK: no adenopathy, no asymmetry, masses, or scars  RESP: lungs clear to auscultation - no rales, rhonchi or wheezes  CV: regular rate and rhythm, normal S1 S2, no S3 or S4, no murmur, click or rub  PSYCH: mentation appears normal, affect normal/bright    Results for orders placed or performed in visit on 01/21/25   XR Chest 2 Views     Status: None    Narrative    EXAM: XR CHEST 2 VIEWS  LOCATION: [facility]  DATE: 1/21/2025    INDICATION:  Acute cough  COMPARISON: None.      Impression    IMPRESSION: Normal cardiac and mediastinal contours. The lungs are symmetrically hyperinflated. There is airway thickening in the perihilar lung fields suggesting viral pneumonitis or reactive airway disease. No focal airspace infiltrate.    CONCLUSION:    Airway disease. No focal pneumonia.            Signed Electronically by: SANTA Rojo CNP

## 2025-07-13 ENCOUNTER — HEALTH MAINTENANCE LETTER (OUTPATIENT)
Age: 30
End: 2025-07-13

## 2025-09-04 ENCOUNTER — ANCILLARY PROCEDURE (OUTPATIENT)
Dept: GENERAL RADIOLOGY | Facility: CLINIC | Age: 30
End: 2025-09-04
Attending: NURSE PRACTITIONER
Payer: COMMERCIAL

## 2025-09-04 ENCOUNTER — OFFICE VISIT (OUTPATIENT)
Dept: FAMILY MEDICINE | Facility: CLINIC | Age: 30
End: 2025-09-04
Payer: COMMERCIAL

## 2025-09-04 VITALS
HEIGHT: 68 IN | BODY MASS INDEX: 35.04 KG/M2 | WEIGHT: 231.2 LBS | OXYGEN SATURATION: 98 % | TEMPERATURE: 97.5 F | RESPIRATION RATE: 19 BRPM | HEART RATE: 96 BPM

## 2025-09-04 DIAGNOSIS — M79.671 RIGHT FOOT PAIN: ICD-10-CM

## 2025-09-04 DIAGNOSIS — M79.671 RIGHT FOOT PAIN: Primary | ICD-10-CM

## 2025-09-04 DIAGNOSIS — L72.9 SUBCUTANEOUS CYST: ICD-10-CM

## 2025-09-04 DIAGNOSIS — B35.3 TINEA PEDIS OF RIGHT FOOT: ICD-10-CM

## 2025-09-04 RX ORDER — KETOCONAZOLE 20 MG/G
CREAM TOPICAL DAILY
Qty: 60 G | Refills: 0 | Status: SHIPPED | OUTPATIENT
Start: 2025-09-04 | End: 2025-09-04

## 2025-09-04 ASSESSMENT — PAIN SCALES - GENERAL: PAINLEVEL_OUTOF10: SEVERE PAIN (8)
